# Patient Record
Sex: FEMALE | Race: WHITE | Employment: PART TIME | URBAN - METROPOLITAN AREA
[De-identification: names, ages, dates, MRNs, and addresses within clinical notes are randomized per-mention and may not be internally consistent; named-entity substitution may affect disease eponyms.]

---
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
## 2024-06-24 NOTE — PROGRESS NOTES
Subjective:      Patient ID: Vincenzo Combs is a 25 y.o. female who presents today for:  Chief Complaint   Patient presents with    Otalgia     Patient presents today for left ear pain, onset 3 days, hurts to eat, post nasal drainge, no fever, pressure in ear, feels like fluid is in her ear,        Otalgia   There is pain in the left ear. This is a new problem. The current episode started in the past 7 days (x 3 days). The problem occurs constantly. The problem has been gradually worsening. There has been no fever. The pain is at a severity of 4/10. The pain is mild. Associated symptoms include rhinorrhea. Pertinent negatives include no coughing, diarrhea, ear discharge, headaches, hearing loss, rash, sore throat or vomiting. She has tried acetaminophen for the symptoms. The treatment provided mild relief. There is no history of a chronic ear infection, hearing loss or a tympanostomy tube. Past Medical History:   Diagnosis Date    Depression      No past surgical history on file. Social History     Socioeconomic History    Marital status: Single     Spouse name: Not on file    Number of children: Not on file    Years of education: Not on file    Highest education level: Not on file   Occupational History    Not on file   Tobacco Use    Smoking status: Never Smoker    Smokeless tobacco: Never Used   Vaping Use    Vaping Use: Never used   Substance and Sexual Activity    Alcohol use: No    Drug use: No    Sexual activity: Not on file   Other Topics Concern    Not on file   Social History Narrative    Not on file     Social Determinants of Health     Financial Resource Strain: Low Risk     Difficulty of Paying Living Expenses: Not hard at all   Food Insecurity: No Food Insecurity    Worried About [address] [employer] in the Last Year: Never true    Molly of Food in the Last Year: Never true   Transportation Needs: No Transportation Needs    Lack of Transportation (Medical):  No    Lack of Transportation (Non-Medical): No   Physical Activity:     Days of Exercise per Week:     Minutes of Exercise per Session:    Stress:     Feeling of Stress :    Social Connections:     Frequency of Communication with Friends and Family:     Frequency of Social Gatherings with Friends and Family:     Attends [employer] Services:     Active Member of Clubs or Organizations:     Attends Club or Organization Meetings:     Marital Status:    Intimate Partner Violence:     Fear of Current or Ex-Partner:     Emotionally Abused:     Physically Abused:     Sexually Abused:      No family history on file. No Known Allergies      Review of Systems   Constitutional: Negative for activity change, appetite change, chills, fatigue and fever. HENT: Positive for ear pain (left ear pain x 3 days), postnasal drip and rhinorrhea. Negative for congestion, drooling, ear discharge, hearing loss, sinus pain, sore throat and trouble swallowing. Pt reports \"it feels like fluid\"     Eyes: Negative for redness, itching and visual disturbance. Respiratory: Negative for apnea, cough, chest tightness, shortness of breath and wheezing. Cardiovascular: Negative for chest pain and palpitations. Gastrointestinal: Negative for abdominal distention, constipation, diarrhea, nausea and vomiting. Endocrine: Negative for heat intolerance. Genitourinary: Negative for difficulty urinating, flank pain and genital sores. Musculoskeletal: Negative for arthralgias, gait problem, myalgias and neck stiffness. Skin: Negative for rash. Neurological: Negative for tremors, seizures, facial asymmetry and headaches. Hematological: Negative for adenopathy. Psychiatric/Behavioral: Negative for confusion and suicidal ideas. All other systems reviewed and are negative.       Objective:   /64   Pulse 99   Temp 98.6 °F (37 °C)   Resp 18   Ht 5' 11\" (1.803 m)   Wt 247 lb 4.8 oz (112.2 kg)   SpO2 99%   BMI 34.49 kg/m² Physical Exam  Vitals and nursing note reviewed. Constitutional:       General: She is awake. She is not in acute distress. Appearance: Normal appearance. She is well-developed, well-groomed and overweight. She is not ill-appearing, toxic-appearing or diaphoretic. HENT:      Head: Normocephalic and atraumatic. Right Ear: External ear normal. No decreased hearing noted. There is no impacted cerumen. Left Ear: Hearing and external ear normal. No decreased hearing noted. No swelling or tenderness. A middle ear effusion is present. There is no impacted cerumen. Tympanic membrane is injected and erythematous. Nose: No rhinorrhea. Mouth/Throat:      Mouth: Mucous membranes are moist.      Pharynx: No posterior oropharyngeal erythema. Eyes:      Pupils: Pupils are equal, round, and reactive to light. Cardiovascular:      Rate and Rhythm: Normal rate and regular rhythm. Pulses: Normal pulses. Heart sounds: Normal heart sounds. No murmur heard. Pulmonary:      Effort: Pulmonary effort is normal. No tachypnea, bradypnea or respiratory distress. Breath sounds: Normal breath sounds and air entry. No stridor or transmitted upper airway sounds. No decreased breath sounds or wheezing. Chest:      Chest wall: No tenderness. Abdominal:      General: Abdomen is flat. Bowel sounds are normal.      Palpations: Abdomen is soft. Hernia: No hernia is present. Musculoskeletal:         General: Normal range of motion. Cervical back: Normal range of motion. Skin:     General: Skin is warm and dry. Capillary Refill: Capillary refill takes less than 2 seconds. Findings: No erythema or rash. Neurological:      General: No focal deficit present. Mental Status: She is alert and oriented to person, place, and time. Mental status is at baseline. Motor: No weakness.    Psychiatric:         Attention and Perception: Attention and perception normal. Mood and Affect: Mood and affect normal.         Speech: Speech normal.         Behavior: Behavior normal. Behavior is cooperative. Thought Content: Thought content normal.         Judgment: Judgment normal.         Assessment:       Diagnosis Orders   1. Acute otitis media with effusion of left ear  amoxicillin (AMOXIL) 875 MG tablet         Plan:      No orders of the defined types were placed in this encounter. Orders Placed This Encounter   Medications    amoxicillin (AMOXIL) 875 MG tablet     Sig: Take 1 tablet by mouth 2 times daily for 10 days     Dispense:  20 tablet     Refill:  0     Pt here today with her mom after pt c/o left ear pain and pressure x 3 days. Pt has a middle ear infection noted today and declines any vertigo today. Pt was prescribed an oral ATB today and to use Tylenol/Ibuprofen as needed for any ear pain noted. Pt verbalized understanding of the [place]. Pt and her mom left the [facility] today in  Stable condition. Discussed signs and symptoms which require immediate follow-up in ED/call to 911. Patient verbalized understanding. Antibiotic Instructions: Complete the full course of antibiotics as ordered. Take each dose with a small snack or meal to lessen potential GI upset. To prevent antibiotic resistance, please take medication as ordered and for the full duration even if you start to feel better. Consider intake of yogurt or probiotic during antibiotic use and for a few days after to help reduce the risk of developing a secondary infection. Separate the yogurt and antibiotic by at least 1 hour. Avoid alcohol while taking antibiotics. Return if symptoms worsen or fail to improve. Reviewed with the patient: current clinical status, medications, activities and diet.      Side effects, adverse effects of the medication prescribed today, as well as treatment plan and result expectations have been discussed with the patient who expresses understanding and desires to skin lesions initially c/f monkeypox, but now less likely given +MRSA bacteremia  -- cont. airborne precautions for now  -- f/u Mpox PCR  -- appreciate ID recs

## 2025-03-13 ENCOUNTER — HOSPITAL ENCOUNTER (INPATIENT)
Age: 25
LOS: 7 days | Discharge: HOME | DRG: 885 | End: 2025-03-20
Attending: PSYCHIATRY & NEUROLOGY | Admitting: PSYCHIATRY & NEUROLOGY
Payer: COMMERCIAL

## 2025-03-13 DIAGNOSIS — F29 PSYCHOSIS, UNSPECIFIED PSYCHOSIS TYPE (HCC): Primary | ICD-10-CM

## 2025-03-13 LAB
ALBUMIN SERPL-MCNC: 4.1 G/DL (ref 3.5–4.6)
ALP SERPL-CCNC: 81 U/L (ref 40–130)
ALT SERPL-CCNC: 17 U/L (ref 0–33)
AMPHET UR QL SCN: ABNORMAL
ANION GAP SERPL CALCULATED.3IONS-SCNC: 11 MEQ/L (ref 9–15)
APAP SERPL-MCNC: <5 UG/ML (ref 10–30)
AST SERPL-CCNC: 20 U/L (ref 0–35)
BARBITURATES UR QL SCN: ABNORMAL
BASOPHILS # BLD: 0 K/UL (ref 0–0.2)
BASOPHILS NFR BLD: 0.2 %
BENZODIAZ UR QL SCN: ABNORMAL
BILIRUB SERPL-MCNC: 0.3 MG/DL (ref 0.2–0.7)
BILIRUB UR QL STRIP: NEGATIVE
BUN SERPL-MCNC: 6 MG/DL (ref 6–20)
CALCIUM SERPL-MCNC: 9.6 MG/DL (ref 8.5–9.9)
CANNABINOIDS UR QL SCN: POSITIVE
CHLORIDE SERPL-SCNC: 105 MEQ/L (ref 95–107)
CHOLEST SERPL-MCNC: 140 MG/DL (ref 0–199)
CK SERPL-CCNC: 108 U/L (ref 0–170)
CLARITY UR: CLEAR
CO2 SERPL-SCNC: 22 MEQ/L (ref 20–31)
COCAINE UR QL SCN: ABNORMAL
COLOR UR: YELLOW
CREAT SERPL-MCNC: 0.65 MG/DL (ref 0.5–0.9)
DRUG SCREEN COMMENT UR-IMP: ABNORMAL
EOSINOPHIL # BLD: 0 K/UL (ref 0–0.7)
EOSINOPHIL NFR BLD: 0.3 %
ERYTHROCYTE [DISTWIDTH] IN BLOOD BY AUTOMATED COUNT: 13.6 % (ref 11.5–14.5)
ETHANOL PERCENT: NORMAL G/DL
ETHANOLAMINE SERPL-MCNC: <10 MG/DL (ref 0–0.08)
FENTANYL SCREEN, URINE: ABNORMAL
GLOBULIN SER CALC-MCNC: 3.2 G/DL (ref 2.3–3.5)
GLUCOSE SERPL-MCNC: 115 MG/DL (ref 70–99)
GLUCOSE UR STRIP-MCNC: NEGATIVE MG/DL
HCG UR QL: NEGATIVE
HCT VFR BLD AUTO: 39.4 % (ref 37–47)
HDLC SERPL-MCNC: 34 MG/DL (ref 40–59)
HGB BLD-MCNC: 13.7 G/DL (ref 12–16)
HGB UR QL STRIP: NEGATIVE
KETONES UR STRIP-MCNC: 15 MG/DL
LDLC SERPL CALC-MCNC: 94 MG/DL (ref 0–129)
LEUKOCYTE ESTERASE UR QL STRIP: NEGATIVE
LYMPHOCYTES # BLD: 1.6 K/UL (ref 1–4.8)
LYMPHOCYTES NFR BLD: 11.3 %
MCH RBC QN AUTO: 30.6 PG (ref 27–31.3)
MCHC RBC AUTO-ENTMCNC: 34.8 % (ref 33–37)
MCV RBC AUTO: 88.1 FL (ref 79.4–94.8)
METHADONE UR QL SCN: ABNORMAL
MONOCYTES # BLD: 1.2 K/UL (ref 0.2–0.8)
MONOCYTES NFR BLD: 8.7 %
NEUTROPHILS # BLD: 10.8 K/UL (ref 1.4–6.5)
NEUTS SEG NFR BLD: 79.1 %
NITRITE UR QL STRIP: NEGATIVE
OPIATES UR QL SCN: ABNORMAL
OXYCODONE UR QL SCN: ABNORMAL
PCP UR QL SCN: ABNORMAL
PH UR STRIP: 7 [PH] (ref 5–9)
PLATELET # BLD AUTO: 273 K/UL (ref 130–400)
POTASSIUM SERPL-SCNC: 3.3 MEQ/L (ref 3.4–4.9)
PROPOXYPH UR QL SCN: ABNORMAL
PROT SERPL-MCNC: 7.3 G/DL (ref 6.3–8)
PROT UR STRIP-MCNC: NEGATIVE MG/DL
RBC # BLD AUTO: 4.47 M/UL (ref 4.2–5.4)
SALICYLATES SERPL-MCNC: <0.3 MG/DL (ref 15–30)
SODIUM SERPL-SCNC: 138 MEQ/L (ref 135–144)
SP GR UR STRIP: 1 (ref 1–1.03)
TRIGL SERPL-MCNC: 62 MG/DL (ref 0–150)
TSH SERPL-MCNC: 0.39 UIU/ML (ref 0.44–3.86)
URINE REFLEX TO CULTURE: ABNORMAL
UROBILINOGEN UR STRIP-ACNC: 0.2 E.U./DL
WBC # BLD AUTO: 13.7 K/UL (ref 4.8–10.8)

## 2025-03-13 PROCEDURE — 84703 CHORIONIC GONADOTROPIN ASSAY: CPT

## 2025-03-13 PROCEDURE — 80179 DRUG ASSAY SALICYLATE: CPT

## 2025-03-13 PROCEDURE — 81003 URINALYSIS AUTO W/O SCOPE: CPT

## 2025-03-13 PROCEDURE — 80053 COMPREHEN METABOLIC PANEL: CPT

## 2025-03-13 PROCEDURE — 6370000000 HC RX 637 (ALT 250 FOR IP): Performed by: PSYCHIATRY & NEUROLOGY

## 2025-03-13 PROCEDURE — 84443 ASSAY THYROID STIM HORMONE: CPT

## 2025-03-13 PROCEDURE — 80307 DRUG TEST PRSMV CHEM ANLYZR: CPT

## 2025-03-13 PROCEDURE — 80143 DRUG ASSAY ACETAMINOPHEN: CPT

## 2025-03-13 PROCEDURE — 82550 ASSAY OF CK (CPK): CPT

## 2025-03-13 PROCEDURE — 36415 COLL VENOUS BLD VENIPUNCTURE: CPT

## 2025-03-13 PROCEDURE — 83036 HEMOGLOBIN GLYCOSYLATED A1C: CPT

## 2025-03-13 PROCEDURE — 85025 COMPLETE CBC W/AUTO DIFF WBC: CPT

## 2025-03-13 PROCEDURE — 82077 ASSAY SPEC XCP UR&BREATH IA: CPT

## 2025-03-13 PROCEDURE — 80061 LIPID PANEL: CPT

## 2025-03-13 PROCEDURE — 99285 EMERGENCY DEPT VISIT HI MDM: CPT

## 2025-03-13 PROCEDURE — 6370000000 HC RX 637 (ALT 250 FOR IP)

## 2025-03-13 PROCEDURE — 93005 ELECTROCARDIOGRAM TRACING: CPT

## 2025-03-13 PROCEDURE — 1240000000 HC EMOTIONAL WELLNESS R&B

## 2025-03-13 RX ORDER — HALOPERIDOL 5 MG/1
5 TABLET ORAL EVERY 6 HOURS PRN
Status: DISCONTINUED | OUTPATIENT
Start: 2025-03-13 | End: 2025-03-20 | Stop reason: HOSPADM

## 2025-03-13 RX ORDER — TRAZODONE HYDROCHLORIDE 50 MG/1
50 TABLET ORAL NIGHTLY PRN
Status: DISCONTINUED | OUTPATIENT
Start: 2025-03-13 | End: 2025-03-16

## 2025-03-13 RX ORDER — HALOPERIDOL 5 MG/ML
5 INJECTION INTRAMUSCULAR EVERY 6 HOURS PRN
Status: DISCONTINUED | OUTPATIENT
Start: 2025-03-13 | End: 2025-03-20 | Stop reason: HOSPADM

## 2025-03-13 RX ORDER — BENZTROPINE MESYLATE 1 MG/ML
2 INJECTION, SOLUTION INTRAMUSCULAR; INTRAVENOUS 2 TIMES DAILY PRN
Status: DISCONTINUED | OUTPATIENT
Start: 2025-03-13 | End: 2025-03-20 | Stop reason: HOSPADM

## 2025-03-13 RX ORDER — HYDROXYZINE PAMOATE 50 MG/1
50 CAPSULE ORAL EVERY 6 HOURS PRN
Status: DISCONTINUED | OUTPATIENT
Start: 2025-03-13 | End: 2025-03-20 | Stop reason: HOSPADM

## 2025-03-13 RX ORDER — HYDROXYZINE HYDROCHLORIDE 50 MG/ML
50 INJECTION, SOLUTION INTRAMUSCULAR EVERY 6 HOURS PRN
Status: DISCONTINUED | OUTPATIENT
Start: 2025-03-13 | End: 2025-03-20 | Stop reason: HOSPADM

## 2025-03-13 RX ORDER — ACETAMINOPHEN 325 MG/1
650 TABLET ORAL EVERY 4 HOURS PRN
Status: DISCONTINUED | OUTPATIENT
Start: 2025-03-13 | End: 2025-03-20 | Stop reason: HOSPADM

## 2025-03-13 RX ORDER — POLYETHYLENE GLYCOL 3350 17 G
2 POWDER IN PACKET (EA) ORAL
Status: DISCONTINUED | OUTPATIENT
Start: 2025-03-13 | End: 2025-03-14

## 2025-03-13 RX ORDER — MAGNESIUM HYDROXIDE/ALUMINUM HYDROXICE/SIMETHICONE 120; 1200; 1200 MG/30ML; MG/30ML; MG/30ML
30 SUSPENSION ORAL PRN
Status: DISCONTINUED | OUTPATIENT
Start: 2025-03-13 | End: 2025-03-20 | Stop reason: HOSPADM

## 2025-03-13 RX ORDER — POLYETHYLENE GLYCOL 3350 17 G
2 POWDER IN PACKET (EA) ORAL
Status: DISCONTINUED | OUTPATIENT
Start: 2025-03-13 | End: 2025-03-20 | Stop reason: HOSPADM

## 2025-03-13 RX ADMIN — NICOTINE POLACRILEX 2 MG: 2 LOZENGE ORAL at 17:06

## 2025-03-13 RX ADMIN — NICOTINE POLACRILEX 2 MG: 2 LOZENGE ORAL at 21:07

## 2025-03-13 RX ADMIN — TRAZODONE HYDROCHLORIDE 50 MG: 50 TABLET ORAL at 21:05

## 2025-03-13 ASSESSMENT — PATIENT HEALTH QUESTIONNAIRE - PHQ9
9. THOUGHTS THAT YOU WOULD BE BETTER OFF DEAD, OR OF HURTING YOURSELF: SEVERAL DAYS
5. POOR APPETITE OR OVEREATING: SEVERAL DAYS
4. FEELING TIRED OR HAVING LITTLE ENERGY: SEVERAL DAYS
SUM OF ALL RESPONSES TO PHQ QUESTIONS 1-9: 15
SUM OF ALL RESPONSES TO PHQ QUESTIONS 1-9: 16
7. TROUBLE CONCENTRATING ON THINGS, SUCH AS READING THE NEWSPAPER OR WATCHING TELEVISION: NEARLY EVERY DAY
6. FEELING BAD ABOUT YOURSELF - OR THAT YOU ARE A FAILURE OR HAVE LET YOURSELF OR YOUR FAMILY DOWN: NEARLY EVERY DAY
SUM OF ALL RESPONSES TO PHQ QUESTIONS 1-9: 16
8. MOVING OR SPEAKING SO SLOWLY THAT OTHER PEOPLE COULD HAVE NOTICED. OR THE OPPOSITE, BEING SO FIGETY OR RESTLESS THAT YOU HAVE BEEN MOVING AROUND A LOT MORE THAN USUAL: NOT AT ALL
SUM OF ALL RESPONSES TO PHQ QUESTIONS 1-9: 16
1. LITTLE INTEREST OR PLEASURE IN DOING THINGS: NEARLY EVERY DAY
10. IF YOU CHECKED OFF ANY PROBLEMS, HOW DIFFICULT HAVE THESE PROBLEMS MADE IT FOR YOU TO DO YOUR WORK, TAKE CARE OF THINGS AT HOME, OR GET ALONG WITH OTHER PEOPLE: VERY DIFFICULT
2. FEELING DOWN, DEPRESSED OR HOPELESS: NEARLY EVERY DAY
3. TROUBLE FALLING OR STAYING ASLEEP: SEVERAL DAYS

## 2025-03-13 ASSESSMENT — SLEEP AND FATIGUE QUESTIONNAIRES
DO YOU USE A SLEEP AID: YES
AVERAGE NUMBER OF SLEEP HOURS: 6
DO YOU HAVE DIFFICULTY SLEEPING: YES

## 2025-03-13 ASSESSMENT — LIFESTYLE VARIABLES
HOW MANY STANDARD DRINKS CONTAINING ALCOHOL DO YOU HAVE ON A TYPICAL DAY: 1 OR 2
HOW OFTEN DO YOU HAVE A DRINK CONTAINING ALCOHOL: MONTHLY OR LESS

## 2025-03-13 NOTE — ED NOTES
Pt reviewed with Dr. Dahl. Reviewed past hx and current assessment. Received order to admit to [facility].

## 2025-03-13 NOTE — ED NOTES
Patient changed into psych safe clothing.   Urine  obtained and sent to lab.   Skin and contraband check performed.   Contraband check negative at this time.   Lab called to draw blood.

## 2025-03-13 NOTE — ED PROVIDER NOTES
[facility] EMERGENCY DEPARTMENT  EMERGENCY DEPARTMENT ENCOUNTER      Pt Name: Mike Wolf  MRN: [id number]  Birthdate 2000  Date of evaluation: 3/13/2025  Provider: HÉCTOR Marina  2:23 PM EDT      CHIEF COMPLAINT       Chief Complaint   Patient presents with    Psychiatric Evaluation         HISTORY OF PRESENT ILLNESS   (Location/Symptom, Timing/Onset, Context/Setting, Quality, Duration, Modifying Factors, Severity)  Note limiting factors.   Mike Wolf is a 25 y.o. female who presents to the emergency department with PMHx major depressive disorder, generalized anxiety disorder.  Patient presents to the ED for evaluation of psychiatric needs.  Patient was at [facility] today and was pink slipped secondary to the concern for paranoid delusions, and inability to care for her ADLs.  Patient was endorsing several paranoid delusions there, including that she feels that her girlfriend and parents are in a cult, feels that people are out to get her and are tracking her, feels that when she is out in the community she has high risk of getting shot.  Per [facility] report patient used to be on psychiatric medicines but she was taken off of all of them and now is not on anything.  On my assessment of the patient she is sleeping, I wake her up, she just tells me that she is tired.  I have to ask her several times why she is here.  She will state things such as \"I am just tired.\"  Or she will state \"it was just a lot.\"  Or she will state \"I am here because I am just too nice.\"  Patient is actively denying SI and HI.  She does endorse that she is paranoid but will not give any further details regarding this.  [facility] pink slip is also endorsing that patient is not caring for her ADLs as she should.    HPI    Nursing Notes were reviewed.    REVIEW OF SYSTEMS    (2-9 systems for level 4, 10 or more for level 5)     Review of Systems   Constitutional:  Negative for chills and fever.   HENT:  Negative for congestion.
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
## 2025-03-13 NOTE — ED NOTES
Call placed from [place] at [facility] stating [facility]

## 2025-03-13 NOTE — ED NOTES
Patient:   CELIA LUTZ            MRN: [id number]            FIN: [id number]               Age:   76 years     Sex:  Male     :  1942   Associated Diagnoses:   AAA (Abdominal Aortic Aneurysm); Adenocarcinoma of lung; COPD (chronic obstructive pulmonary disease) with acute bronchitis; Chronic kidney disease (CKD), stage III (moderate); Prostate cancer   Author:   Yong Boyd MD      Visit Information      Date of Service: 2019 04:18 pm  Performing Location: [facility]  Encounter#: [id number]      Primary Care Provider (PCP):  Yong Boyd MD    NPI# [id number]      Referring Provider:  Yong Boyd MD# [id number]      Chief Complaint   2019 4:32 PM CDT    f/u labs              Additional Information:No additional information recorded during visit.   Chief complaint and symptoms as noted above and confirmed with patient.  Recent lab and diagnostic studies reviewed with patient      History of Present Illness   2015: Mitchell presents to clinic for hospital follow-up.  He recently underwent right sided wedge resection of an isolated pulmonary nodule.  Biopsy returning as adenocarcinoma of the lung.  Postoperative course complicated by an episode of both acute kidney injury and rectal bleeding felt to be consistent with ischemic colitis.  His creatinine on discharge was at his baseline of 1.3.  His lisinopril was held during his episode of acute kidney injury, resumed upon discharge.  He is scheduled to see his thoracic surgeon, Dr. Velazquez, tomorrow.  He also has a history of previous endovascular repair of a abdominal aneurysm.  He follows with a vascular surgeon through [facility] for this.    10/25/2018: Mitchell returns for general follow-up.  Overall feeling well.  His wife recently underwent rotator cuff surgery and has been undergoing rehab.  No interval changes in health since her last visit.  He has yet to schedule with interventional vein specialist.  Did have a  Tele psych at bedside.   MRI of prostate after last visit.  Not currently following with urology.    11/14/2018: Mitchell presents to clinic at the request of his vascular surgeon, Dr. David for further evaluation of his kidney function.  He was scheduled to undergo a CT angiogram of his aorta for surveillance protocol of his previous endovascular aortic repair remotely.  Unfortunately his creatinine returned at 1.7 which was somewhat higher than his baseline of approximately 1.4-1.5.  This was below  eGFR threshold of 40 mL/min.  He feels fine.  No recent hypovolemic history or insult.  No recent medication changes.  He remains on lisinopril hydrochlorothiazide combination for history of hypertension    5/14/2019: Mitchell returns for general follow-up.  Feeling well.  Has planned right-sided vascular procedure with Dr. David scheduled in the near future; unclear on specific procedural information.  He had had a recent CT angiogram done earlier this year which did demonstrate a 2.2 cm pancreatic cyst.  Also reviewed labs showing a general upward trend in PSA.  Has not seen urology since 2016.  Did undergo a prostate MRI in April 2018 which did not demonstrate any extracapsular extension.         Review of Systems   Constitutional:  No fever, No chills.    Eye:  Negative except as documented in history of present illness.    Ear/Nose/Mouth/Throat:  Negative except as documented in history of present illness.    Respiratory   Cardiovascular:  Peripheral edema, No chest pain, No palpitations, No syncope.    Gastrointestinal:  No nausea, No vomiting, No heartburn, No abdominal pain.    Genitourinary:  No dysuria, No hematuria.    Hematology/Lymphatics:  Negative except as documented in history of present illness.    Endocrine:  No excessive thirst, No polyuria.    Immunologic:  No recurrent fevers.    Musculoskeletal:  Joint pain, Decreased range of motion, No muscle pain, No trauma.    Neurologic:  Alert and oriented X4, No numbness, No tingling, No  headache.       Health Status   Allergies:    Allergic Reactions (Selected)  No known allergies   Medications:  (Selected)   Prescriptions  Prescribed  Advair  mcg-21 mcg/inh inhalation aerosol: See Instructions, Instructions: INHALE 2 PUFFS BY MOUTH TWICE DAILY. RINSE MOUTH AND THROAT AFTER USE, # 3 EA, 1 Refill(s), Type: Soft Stop, Pharmacy: [facility] [id number] [facility], keep on file and pt will notify when needed, INHALE 2 PUFFS BY MOUTH TWICE ALFREDITO...  Metoprolol Tartrate 50 mg oral tablet: = 1 tab(s) ( 50 mg ), po, bid, # 180 tab(s), 3 Refill(s), Type: Maintenance, Pharmacy: [facility] [id number] [facility], keep on file and pt will notify when needed, 1 tab(s) Oral bid  Proventil HFA 90 mcg/inh inhalation aerosol: 2 puff(s), INH, q4hr (int), Instructions: prn SOB/cough  use with spacer chamber, # 1 EA, 3 Refill(s), Type: Maintenance, Pharmacy: [id number], 2 puff(s) Inhale q4 hrs,Instr:prn SOB/cough; use with spacer chamber  hydrochlorothiazide-lisinopril 12.5 mg-20 mg oral tablet: 1 tab(s), PO, Daily, # 90 tab(s), 3 Refill(s), Type: Maintenance, Pharmacy: [facility] [id number] [facility], keep on file and pt will notify when needed, 1 tab(s) Oral daily  pravastatin 80 mg oral tablet: = 1 tab(s) ( 80 mg ), po, daily, # 90 tab(s), 3 Refill(s), Type: Maintenance, Pharmacy: [facility] [id number] [facility], keep on file and pt will notify when needed, 1 tab(s) Oral daily  spacer for inhaler: spacer for inhaler, See Instructions, Instructions: use with albuterol inhaler, Supply, # 1 EA, 0 Refill(s), Type: Maintenance, Pharmacy: [facility], use with albuterol inhaler  Documented Medications  Documented  Advil PM: 2 tab(s), po, hs, PRN: as needed for insomnia, 0 Refill(s), Type: Maintenance  Flonase 50 mcg/inh nasal spray: 1 spray(s), nasal, daily, 0 Refill(s), Type: Maintenance  Multiple Vitamins oral tablet: 1 tab(s), po, daily, 0 Refill(s), Type: Maintenance  Vitamin B12 500 mcg oral tablet: 1 tab(s) ( 500 mcg ), po, daily, 0 Refill(s),  Type: Maintenance  lansoprazole 15 mg oral delayed release capsule: = 1 cap(s) ( 15 mg ), PO, Daily, # 90 cap(s), 0 Refill(s), Type: Maintenance  magnesium oxide 250 mg oral tablet: 1 tab(s) ( 250 mg ), po, daily, 0 Refill(s), Type: Maintenance  potassium gluconate: ( 99 mg ), daily, 0 Refill(s), Type: Maintenance,    Medications          *denotes recorded medication          spacer for inhaler: See Instructions, use with albuterol inhaler, 1 EA, 0 Refill(s).          Proventil HFA 90 mcg/inh inhalation aerosol: 2 puff(s), INH, q4hr (int), prn SOB/cough  use with spacer chamber, 1 EA, 3 Refill(s).          *Vitamin B12 500 mcg oral tablet: 500 mcg, 1 tab(s), po, daily, 0 Refill(s).          *Advil PM: 2 tab(s), po, hs, PRN: as needed for insomnia, 0 Refill(s).          *Flonase 50 mcg/inh nasal spray: 1 spray(s), nasal, daily, 0 Refill(s).          Advair  mcg-21 mcg/inh inhalation aerosol: See Instructions, INHALE 2 PUFFS BY MOUTH TWICE DAILY. RINSE MOUTH AND THROAT AFTER USE, 3 EA, 1 Refill(s).          hydrochlorothiazide-lisinopril 12.5 mg-20 mg oral tablet: 1 tab(s), PO, Daily, 90 tab(s), 3 Refill(s).          *lansoprazole 15 mg oral delayed release capsule: 15 mg, 1 cap(s), PO, Daily, 90 cap(s), 0 Refill(s).          *magnesium oxide 250 mg oral tablet: 250 mg, 1 tab(s), po, daily, 0 Refill(s).          Metoprolol Tartrate 50 mg oral tablet: 50 mg, 1 tab(s), po, bid, 180 tab(s), 3 Refill(s).          *Multiple Vitamins oral tablet: 1 tab(s), po, daily, 0 Refill(s).          *potassium gluconate: 99 mg, daily, 0 Refill(s).          pravastatin 80 mg oral tablet: 80 mg, 1 tab(s), po, daily, 90 tab(s), 3 Refill(s).     Problem list:    All Problems  AAA (abdominal aortic aneurysm) / SNOMED CT 514965654 / Confirmed  Adenocarcinoma of lung / SNOMED CT 073882055 / Confirmed  [place] Esophagus / SNOMED CT 5896079079 / Confirmed  CKD (chronic kidney disease) stage 3, GFR 30-59 ml/min / SNOMED CT 1878603795 /  Confirmed  COPD (chronic obstructive pulmonary disease) with acute bronchitis / SNOMED CT 58839244 / Confirmed  Closed hip fracture / SNOMED CT 923440680 / Confirmed  Closed fracture of trochanter of femur / SNOMED CT 5526693169 / Confirmed  Cardiac arrhythmia / SNOMED CT 23726477 / Confirmed  Coronary artery disease due to lipid rich plaque / SNOMED CT 8674931779 / Confirmed  Lipids abnormal / SNOMED CT 616201707 / Confirmed  HTN (Hypertension) / SNOMED CT 94974610 / Confirmed  Former Smoker / SNOMED CT [id number] / Confirmed  GERD (gastroesophageal reflux disease) / SNOMED CT 8865856684 / Confirmed  Anticoagulated / SNOMED CT 125283958 / Confirmed  History of oropharyngeal cancer / SNOMED CT 5477530365 / Confirmed  H/O prostate cancer / SNOMED CT 4117650622 / Confirmed  DJD (Degenerative Joint Disease) / SNOMED CT 9589039867 / Confirmed  H/O unilateral nephrectomy / SNOMED CT 787748615 / Confirmed  Resolved: *Hospitalized@[facility] Atypical chest pain  Resolved: *Hospitalized@[facility] Hip fracture  Resolved: *Hospitalized@[facility] Septic hip vs hematoma  Resolved: History of sepsis / SNOMED CT 5414605449  Resolved: Prostate cancer / SNOMED CT 730322850  Canceled: Oropharyngeal cancer / SNOMED CT 216977590  Canceled: Solitary kidney / SNOMED CT 798965821  Canceled: Solitary kidney / SNOMED CT 382796405      Histories   Past Medical History:    Active  AAA (abdominal aortic aneurysm) (029070566)  Coronary artery disease due to lipid rich plaque (7221398403)  GERD (gastroesophageal reflux disease) (9105570891)  Cardiac arrhythmia (09344084)  CKD (chronic kidney disease) stage 3, GFR 30-59 ml/min (0097068193)  Resolved  *Hospitalized@[facility] Septic hip vs hematoma: Onset on 7/3/2016 at 73 years.  Resolved on 7/7/2016 at 73 years.  History of sepsis (8202792283): Onset on 7/3/2016 at 73 years.  Resolved.  Comments:  7/25/2016 CDT 2:14 PM CDT - Simon , Lorri Severe; due to septic hip.    7/25/2016  CDT 3:37 PM CDT - Alma Barfield  Sepsis organ failure: Acute renal failure.  *Hospitalized@[facility] - Hip fracture: Onset on 2016 at 73 years.  Resolved on 2016 at 73 years.  *Hospitalized@[facility] - Atypical chest pain: Onset on 2015 at 72 years.  Resolved on 2015 at 72 years.  Prostate cancer (116524144):  Resolved.   Family History:    CA - Breast cancer  Sister (Alexa, )  Lupus  Sister (Rebecca)  Alcohol abuse  Sister (Alexa, )  Mother ()  SMA type III  Grandfather (M)  Obese  Sister (Sujey, )     Procedure history:    Colonoscopy (673689645) on 3/21/2017 at 74 Years.  Comments:  3/22/2017 2:33 PM CDT - Filippo Barbour MD  Indication: Adenomatous Polyps  Sedation: MAC  Findings: Adenomatous polyp cecum, hemorrohids  Rec: Repeat in 5 years  Right Hip Bipoloar Hemiarthroplasty on 2016 at 73 Years.  Comments:  2016 7:26 AM CDT - Debby Faustin CMA  Right displasced femoral neck fracture  right thoracotomy on 11/15/2015 at 73 Years.  Comments:  2015 9:07 AM CST - Debby Faustin CMA  wedge resection right upper lung nodule, wedge resection right middle lobe lung nodule, Mediastinal lymph node dissection  Colonoscopy (580031806) on 2014 at 71 Years.  Comments:  5/15/2014 10:56 AM CDT - Livier Stallings RN  Sedation: midazolam, fentanyl  Indication: screen  5-6mm tubular adenom x3, 8mm tubular adenoma x1, 12mm tubular adenoma with advance adenoma due to size  Repeat in 3 years.  nepherectomy in the month of 3/2009 at 66 Years.  Lumbar discectomy in  at 64 Years.  Left salivary gland removal in  at 53 Years.  Oral surgery in  at 52 Years.  Aortic aneurysm, abdominal ([id number]).   Social History:        Alcohol Assessment            Current, 1-2 times per week, 2 drinks/episode average.  3 drinks/episode maximum.      Tobacco Assessment            Past, 20 per day.  52 year(s).      Substance Abuse Assessment             Never      Employment and Education Assessment            Employed, Work/School description: [employer] .      Home and Environment Assessment            Marital status: .  Spouse/Partner name: Darlene Chau.      Nutrition and Health Assessment            Type of diet: Regular.      Exercise and Physical Activity Assessment            Exercise frequency: Never.      Sexual Assessment            Sexually active: Yes.      Physical Examination   vital signs stable, as noted above   Vital Signs   5/14/2019 4:32 PM CDT Temperature Tympanic 96 DegF  LOW    Peripheral Pulse Rate 88 bpm    Systolic Blood Pressure 138 mmHg  HI    Diastolic Blood Pressure 80 mmHg    Mean Arterial Pressure 99 mmHg    Oxygen Saturation 94 %      Measurements from flowsheet : Measurements   5/14/2019 4:32 PM CDT    Weight Measured - Standard                192.12 lb     General:  Alert and oriented, No acute distress.    Eye:  Extraocular movements are intact.    HENT:  Normocephalic, Oral mucosa is moist, No pharyngeal erythema, dentures.    Neck:  Supple, Previous right sided neck dissection with flap.    Respiratory:  Lungs are clear to auscultation, Respirations are non-labored, posterior thoracotomy scar.    Cardiovascular:  Normal rate, Regular rhythm, No murmur, No edema.    Gastrointestinal:  Soft.    Lymphatics:  right ankle/pretibial varicosities; soft to touch.    Neurologic:  Alert, Oriented, Normal motor function, No focal deficits.    Cognition and Speech:  Oriented, Speech clear and coherent.    Psychiatric:  Appropriate mood & affect.       Review / Management   Results review:  Lab results   5/6/2019 8:06 AM CDT Sodium Level 137 mmol/L    Potassium Level 5.2 mmol/L    Chloride Level 99 mmol/L    CO2 Level 32 mmol/L    Glucose Level 96 mg/dL    BUN 24 mg/dL    Creatinine 1.32 mg/dL  HI    BUN/Creat Ratio 18    eGFR 53 mL/min/1.73m2  LOW    eGFR African American 61 mL/min/1.73m2    Calcium Level 9.9 mg/dL    Phosphorus  Level 3.4 mg/dL    PTH Intact 25 pg/mL    Alk Phos 82 unit/L    Cholesterol 227 mg/dL  HI    Non-  HI    HDL 70 mg/dL    Chol/HDL Ratio 3.2      HI    Triglyceride 225 mg/dL  HI    PSA 15.8 ng/mL  HI    U Protein 62 mg/dL  HI    U Protein/Creatinine Ratio 969  HI    Ur Creatinine 64 mg/dL    WBC 4.3    RBC 4.08  LOW    Hgb 12.7 gm/dL  LOW    Hct 37.0 %  LOW    MCV 90.7 fL    MCH 31.1 pg    MCHC 34.3 gm/dL    RDW 14.0 %    Platelet 177    MPV 9.3 fL   .       Impression and Plan   Diagnosis     AAA (Abdominal Aortic Aneurysm) (ACW19-QV I71.4).     Adenocarcinoma of lung (INK61-HI C34.90).     COPD (chronic obstructive pulmonary disease) with acute bronchitis (RGA68-XD J44.1).     Chronic kidney disease (CKD), stage III (moderate) (VDH52-TR N18.3).     Prostate cancer (MTD19-UX C61).         .) h/o AAA, endovascular aortic repair, follows with Dr. David     .) chronic vein disease  - planned greater saphenous vein closure planned in near future    .) polyarthritis pains  - limited benefit with NSAIDs and APAP  s/p right MIRNA after hip fracture from fall (6/2016)   - normal DEXA (7/2015)  - doubt inflammatory arthritis; doubt polymyalgia rheumatica given no upper extremity involvement  - working with Ortho - lasting benefits from intra-articular injections    .) prostate Ca   - original prostate biopsy in '11, repeat biopsy in '16; [place] 6   - watchful surveillance; q6 month PSA; last PSA 15   - MRI of prostate (4/2018): focal coarse calcifications in prostate; no evidence of extra-prostate extension   - last seen by Urology in Fall, 2016; I'd like for him to reconnect with Dr. Bustos to discuss further surveillance mgmt    .) pancreatic cyst  - referral to GI; especially in context of multiple malignancies    .) oropharyngeal cancer with flap '94    .) s/p right wedge resection of limited stage adenocarcinoma of lung (11/2015)    .) COPD   - on Advair 115/21mcg BID with noticeable improvement in  breathing   - albuterol on rare occasions   - has not participated in pulmonary rehab   - consider repeat PFTs in the future    .) hypertension; controlled  current antihypertensive regimen: lisinopril/hctz 20/12.5mg daily, metoprolol 50mg BID  regimen changes: none  intolerance:  future titration/work-up plan:    - SBP <140 goal    .) HLPD   - pravastatin 80mg qhs    .) health maintenance   - CRC due in '22   - q6 month PSA   - Prevacid 30mg BID   - enrolled in [facility]    RTC in 6 months

## 2025-03-14 LAB
ESTIMATED AVERAGE GLUCOSE: 94 MG/DL
HBA1C MFR BLD: 4.9 % (ref 4–6)

## 2025-03-14 PROCEDURE — 1240000000 HC EMOTIONAL WELLNESS R&B

## 2025-03-14 PROCEDURE — 6370000000 HC RX 637 (ALT 250 FOR IP): Performed by: PSYCHIATRY & NEUROLOGY

## 2025-03-14 RX ORDER — DIVALPROEX SODIUM 250 MG/1
250 TABLET, DELAYED RELEASE ORAL EVERY 8 HOURS SCHEDULED
Status: DISCONTINUED | OUTPATIENT
Start: 2025-03-14 | End: 2025-03-20 | Stop reason: HOSPADM

## 2025-03-14 RX ORDER — PALIPERIDONE 3 MG/1
3 TABLET, EXTENDED RELEASE ORAL DAILY
Status: DISCONTINUED | OUTPATIENT
Start: 2025-03-14 | End: 2025-03-20 | Stop reason: HOSPADM

## 2025-03-14 RX ADMIN — HYDROXYZINE PAMOATE 50 MG: 50 CAPSULE ORAL at 08:29

## 2025-03-14 RX ADMIN — PALIPERIDONE 3 MG: 3 TABLET, EXTENDED RELEASE ORAL at 11:41

## 2025-03-14 RX ADMIN — DIVALPROEX SODIUM 250 MG: 250 TABLET, DELAYED RELEASE ORAL at 13:43

## 2025-03-14 RX ADMIN — HYDROXYZINE PAMOATE 50 MG: 50 CAPSULE ORAL at 21:33

## 2025-03-14 RX ADMIN — ACETAMINOPHEN 650 MG: 325 TABLET ORAL at 23:40

## 2025-03-14 RX ADMIN — NICOTINE POLACRILEX 2 MG: 2 LOZENGE ORAL at 08:29

## 2025-03-14 RX ADMIN — ACETAMINOPHEN 650 MG: 325 TABLET ORAL at 08:28

## 2025-03-14 RX ADMIN — NICOTINE POLACRILEX 2 MG: 2 LOZENGE ORAL at 21:14

## 2025-03-14 RX ADMIN — DIVALPROEX SODIUM 250 MG: 250 TABLET, DELAYED RELEASE ORAL at 21:33

## 2025-03-14 RX ADMIN — NICOTINE POLACRILEX 2 MG: 2 LOZENGE ORAL at 16:33

## 2025-03-14 RX ADMIN — HALOPERIDOL 5 MG: 5 TABLET ORAL at 21:13

## 2025-03-14 RX ADMIN — NICOTINE POLACRILEX 2 MG: 2 LOZENGE ORAL at 11:40

## 2025-03-14 RX ADMIN — TRAZODONE HYDROCHLORIDE 50 MG: 50 TABLET ORAL at 21:33

## 2025-03-14 RX ADMIN — NICOTINE POLACRILEX 2 MG: 2 LOZENGE ORAL at 13:43

## 2025-03-14 RX ADMIN — HYDROXYZINE PAMOATE 50 MG: 50 CAPSULE ORAL at 15:27

## 2025-03-14 ASSESSMENT — PAIN DESCRIPTION - DESCRIPTORS
DESCRIPTORS: ACHING
DESCRIPTORS: SORE

## 2025-03-14 ASSESSMENT — PAIN DESCRIPTION - ORIENTATION: ORIENTATION: LOWER

## 2025-03-14 ASSESSMENT — PAIN SCALES - GENERAL
PAINLEVEL_OUTOF10: 3
PAINLEVEL_OUTOF10: 8
PAINLEVEL_OUTOF10: 0

## 2025-03-14 ASSESSMENT — PATIENT HEALTH QUESTIONNAIRE - PHQ9
10. IF YOU CHECKED OFF ANY PROBLEMS, HOW DIFFICULT HAVE THESE PROBLEMS MADE IT FOR YOU TO DO YOUR WORK, TAKE CARE OF THINGS AT HOME, OR GET ALONG WITH OTHER PEOPLE: VERY DIFFICULT
7. TROUBLE CONCENTRATING ON THINGS, SUCH AS READING THE NEWSPAPER OR WATCHING TELEVISION: SEVERAL DAYS
SUM OF ALL RESPONSES TO PHQ QUESTIONS 1-9: 14
2. FEELING DOWN, DEPRESSED OR HOPELESS: NEARLY EVERY DAY
1. LITTLE INTEREST OR PLEASURE IN DOING THINGS: NEARLY EVERY DAY
SUM OF ALL RESPONSES TO PHQ QUESTIONS 1-9: 14
6. FEELING BAD ABOUT YOURSELF - OR THAT YOU ARE A FAILURE OR HAVE LET YOURSELF OR YOUR FAMILY DOWN: MORE THAN HALF THE DAYS
SUM OF ALL RESPONSES TO PHQ QUESTIONS 1-9: 14
SUM OF ALL RESPONSES TO PHQ QUESTIONS 1-9: 14
8. MOVING OR SPEAKING SO SLOWLY THAT OTHER PEOPLE COULD HAVE NOTICED. OR THE OPPOSITE, BEING SO FIGETY OR RESTLESS THAT YOU HAVE BEEN MOVING AROUND A LOT MORE THAN USUAL: NOT AT ALL
4. FEELING TIRED OR HAVING LITTLE ENERGY: NOT AT ALL
5. POOR APPETITE OR OVEREATING: MORE THAN HALF THE DAYS
3. TROUBLE FALLING OR STAYING ASLEEP: NEARLY EVERY DAY
9. THOUGHTS THAT YOU WOULD BE BETTER OFF DEAD, OR OF HURTING YOURSELF: NOT AT ALL

## 2025-03-14 ASSESSMENT — LIFESTYLE VARIABLES
HOW OFTEN DO YOU HAVE A DRINK CONTAINING ALCOHOL: PATIENT DECLINED
HOW MANY STANDARD DRINKS CONTAINING ALCOHOL DO YOU HAVE ON A TYPICAL DAY: PATIENT DECLINED

## 2025-03-14 ASSESSMENT — PAIN DESCRIPTION - LOCATION
LOCATION: BACK

## 2025-03-14 NOTE — CONSULTS
Consult Note            Date:3/14/2025        Patient Name:Mike Wolf     YOB: 2000     Age:25 y.o.    Reason for Consult: Evaluation recommendation for chronic disease management    Chief Complaint     Chief Complaint   Patient presents with    Psychiatric Evaluation          History Obtained From   patient, electronic medical record    History of Present Illness   Patient is a 25-year-old admitted for psychiatric services for behavioral health evaluation.    Hospitalist consulted for evaluation and recommendations for acute and chronic disease management while receiving inpatient psych services.  Patient denies chest pain, palpitations, lightheadedness, headache, dizziness, shortness of breath, cough, N/V/D, and changes in appetite.  Patient also denies smoking, illicit drug, and alcohol use.  Denies self-inflicted injuries or wounds.  Patient has a past medical history of major depressive disorder, generalized anxiety disorder.          Past Medical History     Past Medical History:   Diagnosis Date    Depression         Past Surgical History   History reviewed. No pertinent surgical history.     Medications     Prior to Admission medications    Not on File        paliperidone (INVEGA) extended release tablet 3 mg, Daily  divalproex (DEPAKOTE) DR tablet 250 mg, 3 times per day  acetaminophen (TYLENOL) tablet 650 mg, Q4H PRN  magnesium hydroxide (MILK OF MAGNESIA) 400 MG/5ML suspension 30 mL, Daily PRN  aluminum & magnesium hydroxide-simethicone (MAALOX PLUS) 200-200-20 MG/5ML suspension 30 mL, PRN  haloperidol (HALDOL) tablet 5 mg, Q6H PRN   Or  haloperidol lactate (HALDOL) injection 5 mg, Q6H PRN  benztropine mesylate (COGENTIN) injection 2 mg, BID PRN  traZODone (DESYREL) tablet 50 mg, Nightly PRN  hydrOXYzine pamoate (VISTARIL) capsule 50 mg, Q6H PRN   Or  hydrOXYzine (VISTARIL) injection 50 mg, Q6H PRN  nicotine polacrilex (COMMIT) lozenge 2 mg, Q2H PRN        Allergies   Patient has no

## 2025-03-14 NOTE — H&P
Status 30/30      DIAGNOSIS:    Bipolar mixed severe with psychosis        RISK ASSESSMENT:    SUICIDE RISK ASSESSMENT: high  HOMICIDE: low  AGITATION/VIOLENCE: low  ELOPEMENT: low    LABS: REVIEWED TODAY:  Recent Labs     03/13/25  1324   WBC 13.7*   HGB 13.7        Recent Labs     03/13/25  1324      K 3.3*      CO2 22   BUN 6   CREATININE 0.65   GLUCOSE 115*     Recent Labs     03/13/25  1324   BILITOT 0.3   ALKPHOS 81   AST 20   ALT 17     Lab Results   Component Value Date/Time    BARBSCNU Neg 03/13/2025 12:54 PM    LABBENZ Neg 03/13/2025 12:54 PM    LABMETH Neg 03/13/2025 12:54 PM    ETOH <10 03/13/2025 01:24 PM     Lab Results   Component Value Date/Time    TSH 0.392 03/13/2025 01:24 PM     No results found for: \"LITHIUM\"  No results found for: \"VALPROATE\", \"CBMZ\"  No results found for: \"LITHIUM\", \"VALPROATE\"    FURTHER LABS ORDERED :      Radiology   No results found.    EKG: TRACING REVIEWED    TREATMENT PLAN:    Risk Management:  suicide risk    This patient was assessed for Medical bed necessity for the following reason:  N/A    Collateral Information:  Will obtain collateral information from the family or friends.  Will obtain medical records as appropriate from out patient providers  Will consult the hospitalist for a physical exam to rule out any co-morbid physical condition.    Home medication Reconciled       New Medications started during this admission :    See orders  Prn Haldol 5mg and Vistaril 50mg q6hr for extreme agitation.  Trazodone as ordered for insomnia  Vistaril as ordered for anxiety  Discussed with the patient risk, benefit, alternative and common side effects for the  proposed medication treatment. Patient is consenting to the treatment.    Psychotherapy:   Encourage participation in milieu and group therapy  Individual therapy as needed      Electronically signed by MARY RAVI MD on 3/14/2025 at 10:18 AM

## 2025-03-14 NOTE — CARE COORDINATION
Leisure Assessment  March 14, 2025 /  0955  am    Appearance: Alert, Appears as stated age, and In moderate distress  Current Mental Status: Cooperative, Delusional, and Severe anxiety  Affect/Mood: Congruent/ Fearful and Depressed  Thought Content/Processes: Loose or idiosyncratic associations, Vague, and Disorganized  Insight/Judgement: Poor insight and Fair judgment  Speech: normal rate and normal volume  Delusions/Hallucinations: paranoid and persecutory  Admit Status:  Involuntary     Patient identified her leisure interests were painting and listening to music. Patient described that her support system was recently disrupted as a result of the events leading to hospitalization. Patient reported that she feels she was set up by her family into getting engaged to her biological sister. Patient clarified that her fiance is her sister, and is unsure if her fiance/sister was also setting her up.   Patient was tearful in discussing information related to her childhood experiences. Patient expressed significant self-blame for the actions of adult family members, and shared that she \"grew up in a house of lies.\" When asked about goals during hospitalization, patient fixated on \"taking accountability,\" \"not lying anymore,\" and \"telling the truth.\" Patient identified her strengths were her kindness and that she wants to care for others.     Recommendations: Improve/Maintain Coping Skills Development, Improve/Maintain Emotion Regulation Skills/Mood, Improve/Maintain Expressive Communication/Self-Expression, Improve/Maintain Insight/Self-Awareness, and Promote Reality Orientation    Signature: Janeth Gonzalez, Licensed Professional Music Therapist (LPMT)

## 2025-03-14 NOTE — CARE COORDINATION
Psychosocial Assessment     Admission Reason: Client has been admitted to [facility] for psychiatric evaluation of psychosis     C-SSRS Lifetime Recent Completed - Current Suicide Risk:   [x] No Risk  [] Low [] Moderate [] High     Risk Factors:   Client denies SI     Protective Factors: Client denies SI       Gender:  [] Male [x] Female [] Transgender  [] Other    Sexual Orientation:  [] Heterosexual [x] Homosexual [] Bisexual [] Other    Current or Past Mental Health and/or Addictions Treatment (and response to treatment):  [x] Yes, When and Where: [facility] ago   [] No    Substance Use/Alcohol Use/Addiction (document name of substance, age of onset, how much and how often, route of use and date of last use):  [x] Reports   Substance: Alcohol, marijuana, mushroom   Age of Onset:  Teenage  How Much and how often: Would not answer   Last Usage: Would not answer     [] Denies    AUDIT: -1   DAST: 4  PHQ 9: 14     Education provided:  [x] Yes  [] No  [] N/A [] Refused    Learners: Patient [x]  Family []  Significant Other  [] Caregiver [] Other []   Readiness: Eager []   Acceptance  [x]   Nonacceptances []   Refused []   Method: Explanation [x]   Handout []   Response: Verbalizes Understanding [x]   No evidence of Learning []   Refuses []     Referral made to [facility]  [x] Yes       Date: 03/14/2025               [] No    Family History of Mental Illness or Substance Use/Abuse:   [] Yes (Specify)    [x] No    Trauma and Abuse History:   [] Reports   ( Physical []  Verbal []  Emotional []  Financial []   Sexual [] )  [x] Denies      Legal History:  []  Yes (Specify)    [x] No     Involvement:  [] Yes (Specify)    [x] No     Employment and/or Benefits:    [] Yes (Specify)   SSD []  SSI []   SNAP[] Medicaid[]  [x] No      Leisure & Recreational Interests and Hobbies/Coping Skills:      Ability to Complete Activities of Daily Living (ADLs):  [x] Yes  [] No (Specify)    Health Issues: See H & P    Confucianist

## 2025-03-15 LAB
ANION GAP SERPL CALCULATED.3IONS-SCNC: 11 MEQ/L (ref 9–15)
BUN SERPL-MCNC: 8 MG/DL (ref 6–20)
CALCIUM SERPL-MCNC: 9.2 MG/DL (ref 8.5–9.9)
CHLORIDE SERPL-SCNC: 104 MEQ/L (ref 95–107)
CO2 SERPL-SCNC: 25 MEQ/L (ref 20–31)
CREAT SERPL-MCNC: 0.7 MG/DL (ref 0.5–0.9)
GLUCOSE SERPL-MCNC: 92 MG/DL (ref 70–99)
POTASSIUM SERPL-SCNC: 3.7 MEQ/L (ref 3.4–4.9)
SODIUM SERPL-SCNC: 140 MEQ/L (ref 135–144)
T4 FREE SERPL-MCNC: 1.51 NG/DL (ref 0.84–1.68)

## 2025-03-15 PROCEDURE — 84439 ASSAY OF FREE THYROXINE: CPT

## 2025-03-15 PROCEDURE — 1240000000 HC EMOTIONAL WELLNESS R&B

## 2025-03-15 PROCEDURE — 80048 BASIC METABOLIC PNL TOTAL CA: CPT

## 2025-03-15 PROCEDURE — 36415 COLL VENOUS BLD VENIPUNCTURE: CPT

## 2025-03-15 PROCEDURE — 6370000000 HC RX 637 (ALT 250 FOR IP): Performed by: PSYCHIATRY & NEUROLOGY

## 2025-03-15 RX ADMIN — HYDROXYZINE PAMOATE 50 MG: 50 CAPSULE ORAL at 20:57

## 2025-03-15 RX ADMIN — HYDROXYZINE PAMOATE 50 MG: 50 CAPSULE ORAL at 13:13

## 2025-03-15 RX ADMIN — TRAZODONE HYDROCHLORIDE 50 MG: 50 TABLET ORAL at 20:57

## 2025-03-15 RX ADMIN — NICOTINE POLACRILEX 2 MG: 2 LOZENGE ORAL at 09:58

## 2025-03-15 RX ADMIN — NICOTINE POLACRILEX 2 MG: 2 LOZENGE ORAL at 17:45

## 2025-03-15 RX ADMIN — DIVALPROEX SODIUM 250 MG: 250 TABLET, DELAYED RELEASE ORAL at 14:31

## 2025-03-15 RX ADMIN — DIVALPROEX SODIUM 250 MG: 250 TABLET, DELAYED RELEASE ORAL at 20:57

## 2025-03-15 RX ADMIN — DIVALPROEX SODIUM 250 MG: 250 TABLET, DELAYED RELEASE ORAL at 06:28

## 2025-03-15 RX ADMIN — NICOTINE POLACRILEX 2 MG: 2 LOZENGE ORAL at 13:13

## 2025-03-15 RX ADMIN — PALIPERIDONE 3 MG: 3 TABLET, EXTENDED RELEASE ORAL at 09:56

## 2025-03-16 PROCEDURE — 6370000000 HC RX 637 (ALT 250 FOR IP): Performed by: PSYCHIATRY & NEUROLOGY

## 2025-03-16 PROCEDURE — 6370000000 HC RX 637 (ALT 250 FOR IP): Performed by: NURSE PRACTITIONER

## 2025-03-16 PROCEDURE — 1240000000 HC EMOTIONAL WELLNESS R&B

## 2025-03-16 RX ORDER — TRAZODONE HYDROCHLORIDE 150 MG/1
75 TABLET ORAL NIGHTLY PRN
Status: DISCONTINUED | OUTPATIENT
Start: 2025-03-16 | End: 2025-03-20 | Stop reason: HOSPADM

## 2025-03-16 RX ADMIN — NICOTINE POLACRILEX 2 MG: 2 LOZENGE ORAL at 07:29

## 2025-03-16 RX ADMIN — NICOTINE POLACRILEX 2 MG: 2 LOZENGE ORAL at 11:18

## 2025-03-16 RX ADMIN — PALIPERIDONE 3 MG: 3 TABLET, EXTENDED RELEASE ORAL at 09:40

## 2025-03-16 RX ADMIN — TRAZODONE HYDROCHLORIDE 75 MG: 150 TABLET ORAL at 21:43

## 2025-03-16 RX ADMIN — NICOTINE POLACRILEX 2 MG: 2 LOZENGE ORAL at 15:49

## 2025-03-16 RX ADMIN — DIVALPROEX SODIUM 250 MG: 250 TABLET, DELAYED RELEASE ORAL at 21:43

## 2025-03-16 RX ADMIN — DIVALPROEX SODIUM 250 MG: 250 TABLET, DELAYED RELEASE ORAL at 14:41

## 2025-03-16 RX ADMIN — DIVALPROEX SODIUM 250 MG: 250 TABLET, DELAYED RELEASE ORAL at 06:03

## 2025-03-16 RX ADMIN — NICOTINE POLACRILEX 2 MG: 2 LOZENGE ORAL at 21:26

## 2025-03-16 RX ADMIN — NICOTINE POLACRILEX 2 MG: 2 LOZENGE ORAL at 18:49

## 2025-03-16 RX ADMIN — HYDROXYZINE PAMOATE 50 MG: 50 CAPSULE ORAL at 11:18

## 2025-03-16 RX ADMIN — HYDROXYZINE PAMOATE 50 MG: 50 CAPSULE ORAL at 03:31

## 2025-03-16 NOTE — CONSULTS
Progress Note             Date:3/16/2025        Patient Name:Mike Wolf     YOB: 2000     Age:25 y.o.    Subjective: No events overnight, labs from yesterday/ Feeling tired. Otherwise, no issues    Past Medical History     Past Medical History:   Diagnosis Date    Depression         Past Surgical History   History reviewed. No pertinent surgical history.     Medications     Prior to Admission medications    Not on File        traZODone (DESYREL) tablet 75 mg, Nightly PRN  paliperidone (INVEGA) extended release tablet 3 mg, Daily  divalproex (DEPAKOTE) DR tablet 250 mg, 3 times per day  acetaminophen (TYLENOL) tablet 650 mg, Q4H PRN  magnesium hydroxide (MILK OF MAGNESIA) 400 MG/5ML suspension 30 mL, Daily PRN  aluminum & magnesium hydroxide-simethicone (MAALOX PLUS) 200-200-20 MG/5ML suspension 30 mL, PRN  haloperidol (HALDOL) tablet 5 mg, Q6H PRN   Or  haloperidol lactate (HALDOL) injection 5 mg, Q6H PRN  benztropine mesylate (COGENTIN) injection 2 mg, BID PRN  hydrOXYzine pamoate (VISTARIL) capsule 50 mg, Q6H PRN   Or  hydrOXYzine (VISTARIL) injection 50 mg, Q6H PRN  nicotine polacrilex (COMMIT) lozenge 2 mg, Q2H PRN        Allergies   Patient has no known allergies.    Family History   History reviewed. No pertinent family history.    Review of Systems   12 point review of systems reviewed with patient; negative other than as mentioned    Physical Exam   /84   Pulse 98   Temp 98.1 °F (36.7 °C)   Resp 16   LMP 02/12/2025 (Approximate)   SpO2 100%       CONSTITUTIONAL:  Awake, alert, no apparent distress, and appears stated age  EYES: pupils equal, round and reactive to light   NECK:  Supple   LUNGS:  No increased work of breathing, good air exchange, clear to auscultation bilaterally, no crackles or wheezing  CARDIOVASCULAR:  Normal apical impulse, regular rate and rhythm  ABDOMEN:  No scars, normal bowel sounds, soft, non-distended, non-tender  MUSCULOSKELETAL:  There is no redness,

## 2025-03-16 NOTE — CARE COORDINATION
At approx 215pm pt was approached by this SW to sign her treatment record and update her goal. She was immediately labile and anxious, refusing to speak and became irritable. Pt was asked if she wanted to speak more freely in her room and was encouraged to do so by other patients who were present. Pt was agreeable and met with this SW alone in her room. Pt was hyperverbal and visibly irritated speaking at length about her stressors and triggers prior to hospitalization and with her family. Pt believes her family gaslight her and that some of her family members are part of a cult which bother her greatly. Pt spoke at great length about not dinah mahergn able to trust anyone in her family or in her home (she resides with a friend and her gf) and is unsure if she can return home with having so many questions about things.   Pt was agitated and labile, pacing the room and stating that she feels trapped and abused.   SW deescalated and did tell pt that care for self was important and to not discredit MH professionals. That her psychosis did not mean that her trauma was not present and that they can coexist and both be issues that need attending to. This was a very important part of the conversation and needed reiterated multiple times as the pt basis for reality is skewed due to MH symtpoms. Pt states herself she as having trouble knowing if things were happening or if they were in her mind. Pt was encouraged to speak to staff openly abotu her symptoms and ask questions when she had them. Pt was told to speak to family and her gf and if she felt unsafe returning home she could be placed at [facility]. She was agreeable to speaking to her family and determining her discharge course. Pt states she will update  staff if she feels it is better for her to d/c to the [facility]'s shelter or home tomorrow 03/17.   Meeting concluded approx 240pm

## 2025-03-16 NOTE — CARE COORDINATION
Morning Community Meeting Topics    Mike Porterbenjironal attended the morning community meeting on 3/16/25.    Topics discussed today     [x] Introduction  Day of the week and date  Mask distribution  Current mask requirements  [x]Teams  Explanation of  Green and Blue team criteria  Nurses assigned to each team for today  Explanation about green and blue paper  Date  Patient's Name  Patient's Nurse  Goals  [x] Visitation  Announce the visiting hours for the day  Announce which team is allowed to have visitors for the day  Review any updated Covid 19 requirements for visitors during visitation  Vaccine Card or negative Covid test within 48 hours of visit  State Identification  Patients are reminded to alert the  at least 1 hour before visitation   [x] Unit Orientation  Coffee use  Phone location and etiquette  Shower locations  Washer and dryer location and process  Common area expectations  Staff rounds expectation  [x] Meals   Educate patient to the menu  The patient is encouraged to fill out the menu to get preferences at mealtime  The patient is educated that if they do not fill out the menu, they will get the standard tray  The coffee pot is decaf, patient encouraged to order regular coffee from menu.  Educate patient to the meal process  Patient encouraged to eat snacks provided twice daily  Snacks may stay in patient room     [x] Discharge Process  Discharge expectations  Fill out the survey after discharge   [x] Hygiene  Daily showers encouraged  Showers availability discussed   Daily dressing encouraged  Discussed wearing street clothing  Education provided on where to place linens and clothing  Linens in the hamper  personal clothing does not go into the linen hamper  [x] Group   Patient encouraged to attend group provided  Time of Group Meetings discussed  Gentle reminder that attendance is a Physician order  [x] Movement  Chair exercises completed  Stretching completed  Notes:   Patient participated

## 2025-03-17 PROBLEM — F31.64 BIPOLAR AFFECTIVE DISORDER, MIXED, SEVERE, WITH PSYCHOTIC BEHAVIOR (HCC): Status: ACTIVE | Noted: 2025-03-17

## 2025-03-17 LAB
ALBUMIN SERPL-MCNC: 4.1 G/DL (ref 3.5–4.6)
ALP SERPL-CCNC: 73 U/L (ref 40–130)
ALT SERPL-CCNC: 20 U/L (ref 0–33)
ANION GAP SERPL CALCULATED.3IONS-SCNC: 12 MEQ/L (ref 9–15)
AST SERPL-CCNC: 18 U/L (ref 0–35)
BASOPHILS # BLD: 0 K/UL (ref 0–0.2)
BASOPHILS NFR BLD: 0.5 %
BILIRUB SERPL-MCNC: 0.4 MG/DL (ref 0.2–0.7)
BUN SERPL-MCNC: 10 MG/DL (ref 6–20)
CALCIUM SERPL-MCNC: 9.3 MG/DL (ref 8.5–9.9)
CHLORIDE SERPL-SCNC: 104 MEQ/L (ref 95–107)
CO2 SERPL-SCNC: 22 MEQ/L (ref 20–31)
CREAT SERPL-MCNC: 0.61 MG/DL (ref 0.5–0.9)
EKG ATRIAL RATE: 68 BPM
EKG P AXIS: 7 DEGREES
EKG P-R INTERVAL: 114 MS
EKG Q-T INTERVAL: 394 MS
EKG QRS DURATION: 90 MS
EKG QTC CALCULATION (BAZETT): 418 MS
EKG R AXIS: 84 DEGREES
EKG T AXIS: 54 DEGREES
EKG VENTRICULAR RATE: 68 BPM
EOSINOPHIL # BLD: 0.1 K/UL (ref 0–0.7)
EOSINOPHIL NFR BLD: 1.3 %
ERYTHROCYTE [DISTWIDTH] IN BLOOD BY AUTOMATED COUNT: 14.1 % (ref 11.5–14.5)
GLOBULIN SER CALC-MCNC: 2.6 G/DL (ref 2.3–3.5)
GLUCOSE SERPL-MCNC: 132 MG/DL (ref 70–99)
HCT VFR BLD AUTO: 41 % (ref 37–47)
HGB BLD-MCNC: 14.1 G/DL (ref 12–16)
LYMPHOCYTES # BLD: 1.8 K/UL (ref 1–4.8)
LYMPHOCYTES NFR BLD: 23.7 %
MCH RBC QN AUTO: 30.7 PG (ref 27–31.3)
MCHC RBC AUTO-ENTMCNC: 34.4 % (ref 33–37)
MCV RBC AUTO: 89.3 FL (ref 79.4–94.8)
MONOCYTES # BLD: 0.7 K/UL (ref 0.2–0.8)
MONOCYTES NFR BLD: 8.4 %
NEUTROPHILS # BLD: 5.1 K/UL (ref 1.4–6.5)
NEUTS SEG NFR BLD: 65.7 %
PLATELET # BLD AUTO: 279 K/UL (ref 130–400)
POTASSIUM SERPL-SCNC: 4.1 MEQ/L (ref 3.4–4.9)
PROT SERPL-MCNC: 6.7 G/DL (ref 6.3–8)
RBC # BLD AUTO: 4.59 M/UL (ref 4.2–5.4)
SODIUM SERPL-SCNC: 138 MEQ/L (ref 135–144)
VALPROATE SERPL-MCNC: 53.9 UG/ML (ref 50–100)
WBC # BLD AUTO: 7.7 K/UL (ref 4.8–10.8)

## 2025-03-17 PROCEDURE — 36415 COLL VENOUS BLD VENIPUNCTURE: CPT

## 2025-03-17 PROCEDURE — 80053 COMPREHEN METABOLIC PANEL: CPT

## 2025-03-17 PROCEDURE — 6370000000 HC RX 637 (ALT 250 FOR IP): Performed by: NURSE PRACTITIONER

## 2025-03-17 PROCEDURE — 6370000000 HC RX 637 (ALT 250 FOR IP): Performed by: PSYCHIATRY & NEUROLOGY

## 2025-03-17 PROCEDURE — 85025 COMPLETE CBC W/AUTO DIFF WBC: CPT

## 2025-03-17 PROCEDURE — 80164 ASSAY DIPROPYLACETIC ACD TOT: CPT

## 2025-03-17 PROCEDURE — 1240000000 HC EMOTIONAL WELLNESS R&B

## 2025-03-17 PROCEDURE — 93010 ELECTROCARDIOGRAM REPORT: CPT | Performed by: INTERNAL MEDICINE

## 2025-03-17 RX ADMIN — NICOTINE POLACRILEX 2 MG: 2 LOZENGE ORAL at 20:56

## 2025-03-17 RX ADMIN — NICOTINE POLACRILEX 2 MG: 2 LOZENGE ORAL at 16:20

## 2025-03-17 RX ADMIN — DIVALPROEX SODIUM 250 MG: 250 TABLET, DELAYED RELEASE ORAL at 14:12

## 2025-03-17 RX ADMIN — NICOTINE POLACRILEX 2 MG: 2 LOZENGE ORAL at 10:05

## 2025-03-17 RX ADMIN — HYDROXYZINE PAMOATE 50 MG: 50 CAPSULE ORAL at 11:46

## 2025-03-17 RX ADMIN — NICOTINE POLACRILEX 2 MG: 2 LOZENGE ORAL at 11:46

## 2025-03-17 RX ADMIN — DIVALPROEX SODIUM 250 MG: 250 TABLET, DELAYED RELEASE ORAL at 22:02

## 2025-03-17 RX ADMIN — PALIPERIDONE 3 MG: 3 TABLET, EXTENDED RELEASE ORAL at 10:05

## 2025-03-17 RX ADMIN — TRAZODONE HYDROCHLORIDE 75 MG: 150 TABLET ORAL at 22:43

## 2025-03-17 NOTE — CARE COORDINATION
Client met with [facility] on 03/15/2025 - Cedrick Hassan     \"CPRS had first engagement with client today, she seemed to be in a good mood, reported that she came to [facility] by EMS after using marijuana, and mushrooms and earning / seeing things that were not there, has a family history of ESTHER but has never been to treatment before. Client is open to treatment at this time, I will reach out to [facility] and [facility] for residential treatment, follow up Monday 03/17/2025 early afternoon \"

## 2025-03-17 NOTE — CARE COORDINATION
Client requested to speak with LSW.   Client is overtly concerned about her HIPAA code and does not want communication with family members.     Further discussion for JLUIS and collateral information. Client reports she will consider allowing conversation with her S.O and she would likely be discharged there as well.     To revisit conversation on Tuesday 03/18/2025

## 2025-03-18 PROCEDURE — 1240000000 HC EMOTIONAL WELLNESS R&B

## 2025-03-18 PROCEDURE — 6370000000 HC RX 637 (ALT 250 FOR IP): Performed by: NURSE PRACTITIONER

## 2025-03-18 PROCEDURE — 6370000000 HC RX 637 (ALT 250 FOR IP): Performed by: PSYCHIATRY & NEUROLOGY

## 2025-03-18 RX ADMIN — NICOTINE POLACRILEX 2 MG: 2 LOZENGE ORAL at 18:58

## 2025-03-18 RX ADMIN — DIVALPROEX SODIUM 250 MG: 250 TABLET, DELAYED RELEASE ORAL at 06:05

## 2025-03-18 RX ADMIN — PALIPERIDONE 3 MG: 3 TABLET, EXTENDED RELEASE ORAL at 08:53

## 2025-03-18 RX ADMIN — NICOTINE POLACRILEX 2 MG: 2 LOZENGE ORAL at 13:16

## 2025-03-18 RX ADMIN — NICOTINE POLACRILEX 2 MG: 2 LOZENGE ORAL at 08:51

## 2025-03-18 RX ADMIN — ACETAMINOPHEN 650 MG: 325 TABLET ORAL at 08:51

## 2025-03-18 RX ADMIN — HYDROXYZINE PAMOATE 50 MG: 50 CAPSULE ORAL at 13:16

## 2025-03-18 RX ADMIN — DIVALPROEX SODIUM 250 MG: 250 TABLET, DELAYED RELEASE ORAL at 20:57

## 2025-03-18 RX ADMIN — TRAZODONE HYDROCHLORIDE 75 MG: 150 TABLET ORAL at 22:18

## 2025-03-18 RX ADMIN — DIVALPROEX SODIUM 250 MG: 250 TABLET, DELAYED RELEASE ORAL at 13:16

## 2025-03-18 ASSESSMENT — PAIN SCALES - GENERAL
PAINLEVEL_OUTOF10: 3
PAINLEVEL_OUTOF10: 0

## 2025-03-18 ASSESSMENT — PAIN DESCRIPTION - ORIENTATION: ORIENTATION: LOWER

## 2025-03-18 ASSESSMENT — PAIN DESCRIPTION - LOCATION: LOCATION: BACK

## 2025-03-18 NOTE — CARE COORDINATION
FAMILY COLLATERAL NOTE    Family/Support Name: Marisol Butterfield   Contact #: [phone]   Relationship to Pt::  Significant other         Family/Support contact aware of hospitalization: Yes   Presenting Symptoms/Current Concerns:  Paranoia, difficulty control emotions,       Top 3 Life Stressors:   School - midterms    Employment stress   Stress from family       Background History Relevant to Current Hospitalization:    Reports \"Family is very manipulative and impose themselves into Mike's life.       Family Mental Health/Substance Use History:     NA     Support Network's Goal for Hospitalization: Yes     Discharge Plan: D/C S.O. house and F/U [facility] in community as a new client       Support Network Supportive of Discharge Plan: Yes       Support can confirm Safety of Location and Security of Weapons:   None     Support agreeable to Safeguard and Monitor Medications (including Prescription and OTC): Yes, as needed     Identified Barriers to Compliance with Discharge Plan:  None     Recommendations for Support Network: HEMA meetings         [place]

## 2025-03-19 PROCEDURE — 6370000000 HC RX 637 (ALT 250 FOR IP): Performed by: NURSE PRACTITIONER

## 2025-03-19 PROCEDURE — 1240000000 HC EMOTIONAL WELLNESS R&B

## 2025-03-19 PROCEDURE — 6370000000 HC RX 637 (ALT 250 FOR IP): Performed by: PSYCHIATRY & NEUROLOGY

## 2025-03-19 RX ADMIN — PALIPERIDONE 3 MG: 3 TABLET, EXTENDED RELEASE ORAL at 09:10

## 2025-03-19 RX ADMIN — DIVALPROEX SODIUM 250 MG: 250 TABLET, DELAYED RELEASE ORAL at 13:59

## 2025-03-19 RX ADMIN — HALOPERIDOL 5 MG: 5 TABLET ORAL at 14:13

## 2025-03-19 RX ADMIN — NICOTINE POLACRILEX 2 MG: 2 LOZENGE ORAL at 12:00

## 2025-03-19 RX ADMIN — TRAZODONE HYDROCHLORIDE 75 MG: 150 TABLET ORAL at 21:36

## 2025-03-19 RX ADMIN — DIVALPROEX SODIUM 250 MG: 250 TABLET, DELAYED RELEASE ORAL at 06:23

## 2025-03-19 RX ADMIN — NICOTINE POLACRILEX 2 MG: 2 LOZENGE ORAL at 07:19

## 2025-03-19 RX ADMIN — NICOTINE POLACRILEX 2 MG: 2 LOZENGE ORAL at 16:26

## 2025-03-19 RX ADMIN — HYDROXYZINE PAMOATE 50 MG: 50 CAPSULE ORAL at 13:59

## 2025-03-19 RX ADMIN — DIVALPROEX SODIUM 250 MG: 250 TABLET, DELAYED RELEASE ORAL at 21:36

## 2025-03-19 NOTE — CARE COORDINATION
Pt denies all.  \"I'm just tired.\"  Denies depression and 1/10 anxiety.  Pt was slow to respond, but was resting in bed during assessment.  No delusions or paranoia reported this assessment.

## 2025-03-20 VITALS
RESPIRATION RATE: 16 BRPM | TEMPERATURE: 97.3 F | SYSTOLIC BLOOD PRESSURE: 143 MMHG | HEART RATE: 102 BPM | DIASTOLIC BLOOD PRESSURE: 82 MMHG | OXYGEN SATURATION: 97 %

## 2025-03-20 PROCEDURE — 90656 IIV3 VACC NO PRSV 0.5 ML IM: CPT | Performed by: PSYCHIATRY & NEUROLOGY

## 2025-03-20 PROCEDURE — 6360000002 HC RX W HCPCS: Performed by: PSYCHIATRY & NEUROLOGY

## 2025-03-20 PROCEDURE — G0008 ADMIN INFLUENZA VIRUS VAC: HCPCS | Performed by: PSYCHIATRY & NEUROLOGY

## 2025-03-20 PROCEDURE — 6370000000 HC RX 637 (ALT 250 FOR IP): Performed by: PSYCHIATRY & NEUROLOGY

## 2025-03-20 RX ORDER — PALIPERIDONE 3 MG/1
3 TABLET, EXTENDED RELEASE ORAL DAILY
Qty: 15 TABLET | Refills: 3 | Status: SHIPPED | OUTPATIENT
Start: 2025-03-20

## 2025-03-20 RX ORDER — DIVALPROEX SODIUM 250 MG/1
250 TABLET, DELAYED RELEASE ORAL EVERY 8 HOURS SCHEDULED
Qty: 45 TABLET | Refills: 3 | Status: SHIPPED | OUTPATIENT
Start: 2025-03-20

## 2025-03-20 RX ORDER — TRAZODONE HYDROCHLORIDE 50 MG/1
50 TABLET ORAL NIGHTLY PRN
Qty: 15 TABLET | Refills: 0 | Status: SHIPPED | OUTPATIENT
Start: 2025-03-20

## 2025-03-20 RX ADMIN — ACETAMINOPHEN 650 MG: 325 TABLET ORAL at 08:53

## 2025-03-20 RX ADMIN — PALIPERIDONE 3 MG: 3 TABLET, EXTENDED RELEASE ORAL at 08:53

## 2025-03-20 RX ADMIN — INFLUENZA A VIRUS A/VICTORIA/4897/2022 IVR-238 (H1N1) ANTIGEN (PROPIOLACTONE INACTIVATED), INFLUENZA A VIRUS A/THAILAND/8/2022 IVR-237 (H3N2) ANTIGEN (PROPIOLACTONE INACTIVATED), INFLUENZA B VIRUS B/AUSTRIA/1359417/2021 BVR-26 ANTIGEN (PROPIOLACTONE INACTIVATED) 0.5 ML: 15; 15; 15 INJECTION, SUSPENSION INTRAMUSCULAR at 10:33

## 2025-03-20 RX ADMIN — DIVALPROEX SODIUM 250 MG: 250 TABLET, DELAYED RELEASE ORAL at 06:05

## 2025-03-20 RX ADMIN — NICOTINE POLACRILEX 2 MG: 2 LOZENGE ORAL at 08:53

## 2025-03-20 ASSESSMENT — PAIN DESCRIPTION - LOCATION: LOCATION: BACK

## 2025-03-20 ASSESSMENT — PAIN DESCRIPTION - DESCRIPTORS: DESCRIPTORS: ACHING

## 2025-03-20 ASSESSMENT — PAIN DESCRIPTION - ORIENTATION: ORIENTATION: LOWER

## 2025-03-20 ASSESSMENT — PAIN SCALES - GENERAL: PAINLEVEL_OUTOF10: 5

## 2025-03-20 NOTE — PLAN OF CARE
Problem: Anxiety  Goal: Will report anxiety at manageable levels  Description: INTERVENTIONS:  1. Administer medication as ordered  2. Teach and rehearse alternative coping skills  3. Provide emotional support with 1:1 interaction with staff  Outcome: Not Progressing  Flowsheets (Taken 3/17/2025 7814)  Will report anxiety at manageable levels:   Administer medication as ordered   Teach and rehearse alternative coping skills   Provide emotional support with 1:1 interaction with staff     
  Problem: Confusion  Goal: Confusion, delirium, dementia, or psychosis is improved or at baseline  Description: INTERVENTIONS:  1. Assess for possible contributors to thought disturbance, including medications, impaired vision or hearing, underlying metabolic abnormalities, dehydration, psychiatric diagnoses, and notify attending LIP  2. [place] high risk fall precautions, as indicated  3. Provide frequent short contacts to provide reality reorientation, refocusing and direction  4. Decrease environmental stimuli, including noise as appropriate  5. Monitor and intervene to maintain adequate nutrition, hydration, elimination, sleep and activity  6. If unable to ensure safety without constant attention obtain sitter and review sitter guidelines with assigned personnel  7. Initiate Psychosocial CNS and Spiritual Care consult, as indicated  3/15/2025 1209 by Kasandra Bhatia, RN  Outcome: Not Progressing  Flowsheets (Taken 3/14/2025 1528)  Effect of thought disturbance (confusion, delirium, dementia, or psychosis) are managed with adequate functional status:   [place] high risk fall precautions, as indicated   Assess for contributors to thought disturbance, including medications, impaired vision or hearing, underlying metabolic abnormalities, dehydration, psychiatric diagnoses, notify LIP   Provide frequent short contacts to provide reality reorientation, refocusing and direction  3/14/2025 2306 by Sandra RN  Outcome: Progressing     Problem: Confusion  Goal: Confusion, delirium, dementia, or psychosis is improved or at baseline  Description: INTERVENTIONS:  1. Assess for possible contributors to thought disturbance, including medications, impaired vision or hearing, underlying metabolic abnormalities, dehydration, psychiatric diagnoses, and notify attending LIP  2. [place] high risk fall precautions, as indicated  3. Provide frequent short contacts to provide reality reorientation, refocusing and 
  Problem: Risk for Elopement  Goal: Patient will not exit the unit/facility without proper excort  Outcome: Progressing  Flowsheets (Taken 3/14/2025 1524)  Nursing Interventions for Elopement Risk:   Communicate to physician the risk for elopement   Communicate/escalate to nursing supervisor the risk of elopement   Make sure patient has all necessary personal care items   Reduce environmental triggers     Problem: Anxiety  Goal: Will report anxiety at manageable levels  Description: INTERVENTIONS:  1. Administer medication as ordered  2. Teach and rehearse alternative coping skills  3. Provide emotional support with 1:1 interaction with staff  Outcome: Progressing  Flowsheets (Taken 3/14/2025 1528)  Will report anxiety at manageable levels:   Administer medication as ordered   Provide emotional support with 1:1 interaction with staff   Teach and rehearse alternative coping skills     Problem: Coping  Goal: Pt/Family able to verbalize concerns and demonstrate effective coping strategies  Description: INTERVENTIONS:  1. Assist patient/family to identify coping skills, available support systems and cultural and spiritual values  2. Provide emotional support, including active listening and acknowledgement of concerns of patient and caregivers  3. Reduce environmental stimuli, as able  4. Instruct patient/family in relaxation techniques, as appropriate  5. Assess for spiritual pain/suffering and initiate Spiritual Care, Psychosocial Clinical Specialist consults as needed  Outcome: Progressing  Flowsheets (Taken 3/14/2025 1528)  Patient/family able to verbalize anxieties, fears, and concerns, and demonstrate effective coping:   Assist patient/family to identify coping skills, available support systems and cultural and spiritual values   Provide emotional support, including active listening and acknowledgement of concerns of patient and caregivers     Problem: Confusion  Goal: Confusion, delirium, dementia, or psychosis 
  Problem: Risk for Elopement  Goal: Patient will not exit the unit/facility without proper excort  Outcome: Progressing  Flowsheets (Taken 3/19/2025 1024)  Nursing Interventions for Elopement Risk:   Communicate/escalate to nursing supervisor the risk of elopement   Reduce environmental triggers   Make sure patient has all necessary personal care items   Communicate to physician the risk for elopement     Problem: Anxiety  Goal: Will report anxiety at manageable levels  Description: INTERVENTIONS:  1. Administer medication as ordered  2. Teach and rehearse alternative coping skills  3. Provide emotional support with 1:1 interaction with staff  Outcome: Progressing  Flowsheets (Taken 3/19/2025 1024)  Will report anxiety at manageable levels: Provide emotional support with 1:1 interaction with staff     Problem: Coping  Goal: Pt/Family able to verbalize concerns and demonstrate effective coping strategies  Description: INTERVENTIONS:  1. Assist patient/family to identify coping skills, available support systems and cultural and spiritual values  2. Provide emotional support, including active listening and acknowledgement of concerns of patient and caregivers  3. Reduce environmental stimuli, as able  4. Instruct patient/family in relaxation techniques, as appropriate  5. Assess for spiritual pain/suffering and initiate Spiritual Care, Psychosocial Clinical Specialist consults as needed  Outcome: Progressing  Flowsheets (Taken 3/19/2025 1024)  Patient/family able to verbalize anxieties, fears, and concerns, and demonstrate effective coping: Provide emotional support, including active listening and acknowledgement of concerns of patient and caregivers     Problem: Confusion  Goal: Confusion, delirium, dementia, or psychosis is improved or at baseline  Description: INTERVENTIONS:  1. Assess for possible contributors to thought disturbance, including medications, impaired vision or hearing, underlying metabolic 
Patient is out and social. Brightened affect. Impaired concentration. Disorganized thoughts. Anxiety and depression are \"better\" this evening. Denies SI/HI and AVH.   Problem: Risk for Elopement  Goal: Patient will not exit the unit/facility without proper excort  3/15/2025 1954 by Radha Wylie, RN  Outcome: Progressing  3/15/2025 1209 by Kasandra Bhatia RN  Outcome: Progressing  Flowsheets (Taken 3/15/2025 1141)  Nursing Interventions for Elopement Risk:   Communicate to physician the risk for elopement   Communicate/escalate to nursing supervisor the risk of elopement   Make sure patient has all necessary personal care items   Reduce environmental triggers     Problem: Anxiety  Goal: Will report anxiety at manageable levels  Description: INTERVENTIONS:  1. Administer medication as ordered  2. Teach and rehearse alternative coping skills  3. Provide emotional support with 1:1 interaction with staff  3/15/2025 1954 by Radha Wylie RN  Outcome: Progressing  3/15/2025 1209 by Kasandra Bhatia RN  Outcome: Progressing  Flowsheets (Taken 3/14/2025 1528)  Will report anxiety at manageable levels:   Administer medication as ordered   Provide emotional support with 1:1 interaction with staff   Teach and rehearse alternative coping skills     Problem: Coping  Goal: Pt/Family able to verbalize concerns and demonstrate effective coping strategies  Description: INTERVENTIONS:  1. Assist patient/family to identify coping skills, available support systems and cultural and spiritual values  2. Provide emotional support, including active listening and acknowledgement of concerns of patient and caregivers  3. Reduce environmental stimuli, as able  4. Instruct patient/family in relaxation techniques, as appropriate  5. Assess for spiritual pain/suffering and initiate Spiritual Care, Psychosocial Clinical Specialist consults as needed  3/15/2025 1954 by Radha Wylie, RN  Outcome: Progressing  3/15/2025 1209 by 
Patient visible and social with peers. Patient rates anxiety 5/10 and depression 5-6/10. Pt verbalized, \" Certain things trigger me. \"My partner called, I tried to talk about codependency she hung up on me.\" Patient partner visited. Pt denies SI, HI, and AVH. Mood, \" Tired.\" Patient stated, \" I feel like my brain is being slowed down, too slow.\" Patient relates this feeling to medications. Patient reports constipation. Patient offered/ refused MOM. Patient encouraged increase water intake. Patient provided prune juice. Medication compliant. Patient hopeful in job search utilizing [employer]     Problem: Risk for Elopement  Goal: Patient will not exit the unit/facility without proper excort  Outcome: Progressing  Flowsheets (Taken 3/18/2025 1706)  Nursing Interventions for Elopement Risk:   Shoes and clothing collected and placed in gown attire   Make sure patient has all necessary personal care items   Reduce environmental triggers     Problem: Anxiety  Goal: Will report anxiety at manageable levels  Description: INTERVENTIONS:  1. Administer medication as ordered  2. Teach and rehearse alternative coping skills  3. Provide emotional support with 1:1 interaction with staff  Outcome: Progressing  Flowsheets (Taken 3/18/2025 1706)  Will report anxiety at manageable levels:   Administer medication as ordered   Teach and rehearse alternative coping skills   Provide emotional support with 1:1 interaction with staff     Problem: Coping  Goal: Pt/Family able to verbalize concerns and demonstrate effective coping strategies  Description: INTERVENTIONS:  1. Assist patient/family to identify coping skills, available support systems and cultural and spiritual values  2. Provide emotional support, including active listening and acknowledgement of concerns of patient and caregivers  3. Reduce environmental stimuli, as able  4. Instruct patient/family in relaxation techniques, as appropriate  5. Assess for spiritual 
consults as needed  3/16/2025 2039 by Radha Wylie, RN  Outcome: Progressing  Flowsheets (Taken 3/16/2025 0939 by Flip Caldera, RN)  Patient/family able to verbalize anxieties, fears, and concerns, and demonstrate effective coping:   Assist patient/family to identify coping skills, available support systems and cultural and spiritual values   Provide emotional support, including active listening and acknowledgement of concerns of patient and caregivers   Instruct patient/family in relaxation techniques, as appropriate  3/16/2025 0731 by Flip Caldera, RN  Outcome: Progressing     Problem: Confusion  Goal: Confusion, delirium, dementia, or psychosis is improved or at baseline  Description: INTERVENTIONS:  1. Assess for possible contributors to thought disturbance, including medications, impaired vision or hearing, underlying metabolic abnormalities, dehydration, psychiatric diagnoses, and notify attending LIP  2. [place] high risk fall precautions, as indicated  3. Provide frequent short contacts to provide reality reorientation, refocusing and direction  4. Decrease environmental stimuli, including noise as appropriate  5. Monitor and intervene to maintain adequate nutrition, hydration, elimination, sleep and activity  6. If unable to ensure safety without constant attention obtain sitter and review sitter guidelines with assigned personnel  7. Initiate Psychosocial CNS and Spiritual Care consult, as indicated  3/16/2025 2039 by Radha Wylie, RN  Outcome: Progressing  Flowsheets (Taken 3/16/2025 0939 by Flip Caldera, RN)  Effect of thought disturbance (confusion, delirium, dementia, or psychosis) are managed with adequate functional status:   Assess for contributors to thought disturbance, including medications, impaired vision or hearing, underlying metabolic abnormalities, dehydration, psychiatric diagnoses, notify LIP   Decrease environmental stimuli, including noise as 
family and/or belongings  3. Encourage verbalization of thoughts and concerns in a socially appropriate manner  4. Utilize positive, consistent limit setting strategies supporting safety of patient, staff and others  5. Encourage participation in the decision making process about the behavioral management agreement  6. If a visitor's behavior poses a threat to safety call refer to organization policy.  7. Initiate consult with , Psychosocial CNS, Spiritual Care as appropriate  Outcome: Progressing     Problem: Depression/Self Harm  Goal: Effect of psychiatric condition will be minimized and patient will be protected from self harm  Description: INTERVENTIONS:  1. Assess impact of patient's symptoms on level of functioning, self care needs and offer support as indicated  2. Assess patient/family knowledge of depression, impact on illness and need for teaching  3. Provide emotional support, presence and reassurance  4. Assess for possible suicidal thoughts or ideation. If patient expresses suicidal thoughts or statements do not leave alone, initiate Suicide Precautions, move to a room close to the nursing station and obtain sitter  5. Initiate consults as appropriate with Mental Health Professional, Spiritual Care, Psychosocial CNS, and consider a recommendation to the LIP for a Psychiatric Consultation  Outcome: Progressing     Problem: Involuntary Admit  Goal: Will cooperate with staff recommendations and doctor's orders and will demonstrate appropriate behavior  Description: INTERVENTIONS:  1. Treat underlying conditions and offer medication as ordered  2. Educate regarding involuntary admission procedures and rules  3. Contain excessive/inappropriate behavior per unit and hospital policies  Outcome: Progressing     
Provide emotional support, presence and reassurance     Problem: Involuntary Admit  Goal: Will cooperate with staff recommendations and doctor's orders and will demonstrate appropriate behavior  Description: INTERVENTIONS:  1. Treat underlying conditions and offer medication as ordered  2. Educate regarding involuntary admission procedures and rules  3. Contain excessive/inappropriate behavior per unit and hospital policies  Outcome: Progressing  Flowsheets (Taken 3/17/2025 1353 by Jennifer Dallas, RN)  Will cooperate with staff recommendations and doctor's orders and will demonstrate appropriate behavior: Treat underlying conditions and offer medication as ordered     Problem: Pain  Goal: Verbalizes/displays adequate comfort level or baseline comfort level  Outcome: Progressing     Problem: Discharge Planning  Goal: Discharge to home or other facility with appropriate resources  Outcome: Progressing     Problem: Anxiety  Goal: Will report anxiety at manageable levels  Description: INTERVENTIONS:  1. Administer medication as ordered  2. Teach and rehearse alternative coping skills  3. Provide emotional support with 1:1 interaction with staff  3/17/2025 2355 by Jackie Leigh RN  Outcome: Progressing  3/17/2025 1359 by Jennifer Dallas, RN  Outcome: Not Progressing  Flowsheets (Taken 3/17/2025 1353)  Will report anxiety at manageable levels:   Administer medication as ordered   Teach and rehearse alternative coping skills   Provide emotional support with 1:1 interaction with staff     
RN  Outcome: Progressing  3/15/2025 1954 by Radha Wylie RN  Outcome: Progressing     Problem: Discharge Planning  Goal: Discharge to home or other facility with appropriate resources  3/16/2025 0731 by Flip Caldera RN  Outcome: Progressing  3/15/2025 1954 by Radha Wylie, RN  Outcome: Progressing     
as indicated  2. Assess patient/family knowledge of depression, impact on illness and need for teaching  3. Provide emotional support, presence and reassurance  4. Assess for possible suicidal thoughts or ideation. If patient expresses suicidal thoughts or statements do not leave alone, initiate Suicide Precautions, move to a room close to the nursing station and obtain sitter  5. Initiate consults as appropriate with Mental Health Professional, Spiritual Care, Psychosocial CNS, and consider a recommendation to the LIP for a Psychiatric Consultation  Outcome: Progressing     Problem: Involuntary Admit  Goal: Will cooperate with staff recommendations and doctor's orders and will demonstrate appropriate behavior  Description: INTERVENTIONS:  1. Treat underlying conditions and offer medication as ordered  2. Educate regarding involuntary admission procedures and rules  3. Contain excessive/inappropriate behavior per unit and hospital policies  Outcome: Progressing     Problem: Pain  Goal: Verbalizes/displays adequate comfort level or baseline comfort level  Outcome: Progressing     Problem: Discharge Planning  Goal: Discharge to home or other facility with appropriate resources  Outcome: Progressing     
recommendations and doctor's orders and will demonstrate appropriate behavior  Description: INTERVENTIONS:  1. Treat underlying conditions and offer medication as ordered  2. Educate regarding involuntary admission procedures and rules  3. Contain excessive/inappropriate behavior per unit and hospital policies  3/19/2025 2117 by Aba Daigle, RN  Outcome: Progressing  Flowsheets (Taken 3/19/2025 2112)  Will cooperate with staff recommendations and doctor's orders and will demonstrate appropriate behavior:   Contain excessive/inappropriate behavior per unit and hospital policies   Educate regarding involuntary admission procedures and rules   Treat underlying conditions and offer medication as ordered  3/19/2025 1131 by Aba Daigle, RN  Outcome: Progressing  Flowsheets (Taken 3/19/2025 1024)  Will cooperate with staff recommendations and doctor's orders and will demonstrate appropriate behavior:   Contain excessive/inappropriate behavior per unit and hospital policies   Educate regarding involuntary admission procedures and rules   Treat underlying conditions and offer medication as ordered     Problem: Pain  Goal: Verbalizes/displays adequate comfort level or baseline comfort level  3/19/2025 2117 by Aba Daigle, RN  Outcome: Progressing  3/19/2025 1131 by Aba Daigle, RN  Outcome: Progressing     Problem: Discharge Planning  Goal: Discharge to home or other facility with appropriate resources  3/19/2025 2117 by Aba Daigle, RN  Outcome: Progressing  3/19/2025 1131 by Aba Daigle, RN  Outcome: Progressing

## 2025-03-20 NOTE — PROGRESS NOTES
Behavioral Services  Medicare Certification Upon Admission    I certify that this patient's inpatient psychiatric hospital admission is medically necessary for:    [x] (1) Treatment which could reasonably be expected to improve this patient's condition,       [x] (2) Or for diagnostic study;     AND     [x](2) The inpatient psychiatric services are provided while the individual is under the care of a physician and are included in the individualized plan of care.    Estimated length of stay/service 3-5    Plan for post-hospital care OP care    Electronically signed by MARY RAVI MD on 3/14/2025 at 10:15 AM      
BEHAVIORAL HEALTH FOLLOW-UP NOTE     3/15/2025     Patient was seen and examined in person, Chart reviewed   Patient's case discussed with staff/team    Chief Complaint: Psychosis    Interim History:     Mike is noted to be out on the unit interactive with peers and staff, she has been attending groups and unit activity.  She met with [facility] today is interested in residential treatment for substance abuse.  Staff report that she was fairly psychotic in her behaviors yesterday believing that other people on the unit were long-lost family members  She is more linear today thoughts are more stable.  She interacts appropriately and shares fair eye contact  Future focused and goal directed  Reports feeling better since admission    Appetite:   [] Normal/Unchanged  [] Increased  [x] Decreased      Sleep:       [] Normal/Unchanged  [] Fair       [x] Poor              Energy:    [] Normal/Unchanged  [] Increased  [x] Decreased        SI [] Present  [x] Absent    HI  []Present  [x] Absent     Aggression:  [] yes  [x] no    Patient is [x] able  [] unable to CONTRACT FOR SAFETY     PAST MEDICAL/PSYCHIATRIC HISTORY:   Past Medical History:   Diagnosis Date    Depression        FAMILY/SOCIAL HISTORY:  History reviewed. No pertinent family history.  Social History     Socioeconomic History    Marital status: Single     Spouse name: Not on file    Number of children: Not on file    Years of education: Not on file    Highest education level: Not on file   Occupational History    Not on file   Tobacco Use    Smoking status: Never    Smokeless tobacco: Never   Vaping Use    Vaping status: Never Used   Substance and Sexual Activity    Alcohol use: No    Drug use: No    Sexual activity: Not on file   Other Topics Concern    Not on file   Social History Narrative    Not on file     Social Drivers of Health     Financial Resource Strain: Low Risk  (9/1/2021)    Overall Financial Resource Strain (CARDIA)     Difficulty of 
BEHAVIORAL HEALTH FOLLOW-UP NOTE     3/18/2025     Patient was seen and examined in person, Chart reviewed   Patient's case discussed with staff/team    Chief Complaint: Psychosis    Interim History:     Pt remain labile and tearful during interview  Not feeling safe going home  Remain depressed and feel that the medication is not helping her  Unable to contract for safety  Home living situation is her main stressor    Appetite:   [x] Normal/Unchanged  [] Increased  [] Decreased      Sleep:       [] Normal/Unchanged  [x] Fair       [] Poor              Energy:    [x] Normal/Unchanged  [] Increased  [] Decreased        SI [] Present  [x] Absent    HI  []Present  [x] Absent     Aggression:  [] yes  [x] no    Patient is [x] able  [] unable to CONTRACT FOR SAFETY     PAST MEDICAL/PSYCHIATRIC HISTORY:   Past Medical History:   Diagnosis Date    Depression        FAMILY/SOCIAL HISTORY:  History reviewed. No pertinent family history.  Social History     Socioeconomic History    Marital status: Single     Spouse name: Not on file    Number of children: Not on file    Years of education: Not on file    Highest education level: Not on file   Occupational History    Not on file   Tobacco Use    Smoking status: Never    Smokeless tobacco: Never   Vaping Use    Vaping status: Never Used   Substance and Sexual Activity    Alcohol use: No    Drug use: No    Sexual activity: Not on file   Other Topics Concern    Not on file   Social History Narrative    Not on file     Social Drivers of Health     Financial Resource Strain: Low Risk  (9/1/2021)    Overall Financial Resource Strain (CARDIA)     Difficulty of Paying Living Expenses: Not hard at all   Food Insecurity: No Food Insecurity (9/1/2021)    Hunger Vital Sign     Worried About Running Out of Food in the Last Year: Never true     Ran Out of Food in the Last Year: Never true   Transportation Needs: No Transportation Needs (9/1/2021)    PRAPARE - Transportation     Lack of 
CLINICAL PHARMACY NOTE: MEDS TO BEDS    Total # of Prescriptions Filled: 3   The following medications were delivered to the patient:  Divalproex Dr 250 mg Tab  Paliperidone Er 3 mg Tab  Trazodone 50 mg Tab    Additional Documentation:    
Explained and Gave newly ordered Depakote 250 and tylenol again for pain and nict edgardo, pt denied questions was agreeable  try,  
Explained and gave am meds, pt said yes to tylenol and no to extra prn vistaril, pt reported anxiety # 5 reports feeling distraught his am, pt reports living with female ,etc and a dog ,denies being able to talk with them that she thinks they may be talking about her sometimes, and doesn't know what they think ,reports poor sleep that she wakes up freq pt denies depression , denies any suicidal thoughts.   
[facility] if [facility] will be discharged tomorrow to their facility. This nurse spoke with patient and she was unaware of going to a treatment center tomorrow. Unit number given to [facility] to call back tomorrow to verify with social work    
Patient admitted to 3[facility] via WC by ED staff.  Dual skin assessment completed with Leah DIAZ. Skin found to be unremarkable and no contraband found.  Patient given a tour of her room and a tour of the unit.  Patient would like a dinner tray.  Dietary called.  Patient observed ambulating with a steady gait.  Patient denying suicidal ideation and contracts for safety on 3W.  Admission kit at .  
Patient at the desk requested/ received PRN vistaril   
Patient discharged to home via private vehicle. All belongings sent with patient and medications from [facility]. Voices no questions/concerns.   
Patient is visible on unit and social with select peers. Patient denies SI, HI. Patient is paranoid and was over heard telling peers \"I dont trust anyone here anymore\". During 1400 medication administration patient told this nurse that she feels the medication is \"making me feel more psychotic\" and states she is \"seeing and hearing\" things that are not there. Patient would not elaborate what the voices are saying or what exactly she is seeing. RN made aware. Patient is able to make needs known and agrees to maintain safety while on unit.   Electronically signed by Gina Grier LPN on 3/16/2025 at 3:12 PM      
Patient is visible on unit, social with peers and staff. Patient denies SI, HI AVT hallucinations at this time. Patient is noted to be labile and paranoid at times. Patient tearfully approached this nurse stating \"I am having a hard time learning about things and I feel I am being 'gaslight' all the time\". Patient appeared nonsensical and tangential. Patient then requested Depakote. Patient was informed that Depakote is a scheduled medication and is not able to be given at this time. Patient then requested vistaril, however patient had just taken vistaril at 1313 and could only have vistaril every 6 hours as needed. Patient was offered coloring pages and sensory room. Patient willingly accepted coloring pages and was seen socializing and appeared to be laughing and smiling with peers. Patient is able to make needs known and verbally agrees to maintain safety while on unit.   Electronically signed by Gina Grier LPN on 3/15/2025 at 4:05 PM    
Pt came to this staff nurse in tears , saying \"she wanted to change her HIPAA code that she doesn't want her Mom holli or father mesha to have any information, that they have just found out she is in the hospital , and if they find me when I leave they will hurt me,\" pt was encouraged to talk with the social workers, pt says you guys need to say Im not here, pt expressed sleep was better but she is still waking up a lot.stated now her anxiety and depression are a #10, offered and gave  vistaril 50mg and nict edgardo .  
Pt denies all depression anxiety this am reports sleep was a little bit off, she thinks is from having a prune juice before bed, pt still has nict edgardo in her mouth , denied the need for another one now, denied any suicidal thoughts or voices no paranoia expressed this am, pt has been watching tv, minimally social with peers, pt is smily   
Pt is drawing/coloring now in dinning room, stated she still woke up a lot that she is a light sleeper, pt denied any suicidal thoughts or voices, reports she is thinking about calling her roommate but will see what the doctor has to say, pt was asked if its a good place to return to live, as pt denies any gas lighting thoughts today, also says all the furniture is hers that her parents gave her, reports depression #1 anxiety #3, pt requested tylenol for back pain  
Pt is noted up on the unit, reports anxiety #7, depression #7, back soreness #3,denied voices or suicidal thoughts, reports poor sleep at home, reports eating ok, offered and gave vistaril 50 and tylenol , pt was informed may have to talk with doctor first for home meds,   
Pt is sitting in the dinning room now, stated she feels a little better, appears calmer  
Pt is tearful, talking nonsensical racing, paranoid thoughts expressed about her mom and dad and room mate that they are all talking about her and the patients are talking about her, pt expressed thoughts about school , that at [employer] school there is a cult going on and pt talked about her dad raping her sister, pt was accepting of vistaril 50 that she usually asks for daily and also haldol 5mg given po , pt appears to not swallow and a mouth check was done,   
Pt is with  now  
Pt reports am vistaril helpful , explained and gave newly ordered invega 3 mg and nict edgardo ,  pt is smiling and noted to get many books from the library this am  
Pt returned will reynoso ,says she changed her mind  
Pt tearful and pacing unit. Patient hugging all the other patients, was observed hugging three patients and attempted to hug a staff member. Pt reminded of our rules and asked to please refrain from physical contact. Pt reacted adversely to redirection and became irritated stating \"I know YOU guys have issues so I'll stop\"   
Pt up to nurses station c/o back pain and requesting to be let in to the shower again. Declined prn medication. Provided with supplies and let in to shower at this time.   
Received prn haldol for increased anxiety and agitation, per request. Pt observed crying in the dayroom and agreed to talk to this nurse in her room privately. She states that she can't trust her family and that they have lied to her continuously. She believes that they raised her in a cult. Voices that she doesn't know who to trust and believes that her family members are on the unit with her. Received supplies and let in to the shower at this time, per request.   
Received prn tylenol for aching back pain rated 8/10, per request.   
Report taken from Leah DIAZ in the [facility]/ED.  Patient is a 24yo female admitted on a pink slip from the [facility] with a dx: Psychosis.  Patient is a 25 y.o.  female who presents for psychosis.  Patient has poor insight and poor judgment.  She is currently in an active state of psychosis with paranoid and persecutory delusional thinking.  Patient has delusions that her parents and family are trying to frame her for various things and that they have tapped into her phone and computer so they can listen on conversations.  The patient's anxiety and psychosis is significantly and negatively affecting her day-to-day life.  Patient's fiancéMarisol, was contacted who confirms the patient's behaviors and delusional thinking over the past few days.  She reports that the patient is having difficulty functioning due to this psychosis.  Patient requires inpatient psychiatric admission for safety and stabilization of current symptoms.  Dx:   Acute psychosis  
See chart for EKG, done march 13, 2025 normal sr, normal EKG, hr 68, qt/qtc 394/418 appears not to have transmitted  
Spiritual Support Group Note    Number of Participants in Group: 6                       Time: 2874-8263    Goal: Relief from isolation and loneliness             Nilam Sharing             Self-understanding and gain insight              Acceptance and belonging            Recognize they are not alone                Socialization             Empowerment       Encouragement    Topic:  [x] Spiritual Wellness and Self Care                  [] Hope                     [x] Connecting with Divine/Others        [] Thankfulness and Gratitude               []  Meaningfulness and Purpose               [] Forgiveness               [] Peace               [] Connect to Community      [] Other    Participation Level:   [x] Active Listener   [] Minimal   [] Monopolizing   [x] Interactive   [] No Participation   []  Other:     Attention:   [x] Alert   [] Distractible   [] Drowsy   [] Poor   [] Other:    Manner:   [x] Cooperative   [] Suspicious   [] Withdrawn   [] Guarded   [] Irritable   [] Inhospitable   [] Other:     Others Comments from Group:    
drug-to-drug interactions and alternatives to treatment were discussed.  Collateral information: Followed by social work  CD evaluation  Encourage patient to attend group and other milieu activities.  Discharge planning discussed with the patient and treatment team.    PSYCHOTHERAPY/COUNSELING:  [x] Therapeutic interview  [x] Supportive  [] CBT  [] Ongoing  [] Other    Patient was seen 1:1 for 20 minutes, other than E&M time spent, focusing on      - coping skills techniques     - Anxiety management techniques discussed including deep breathing exercise and PMR     - discussing patients strength and weakness      - Motivational interviewing to assess the stage of change and assessing patient readiness to quit substance use.      [x] Patient continues to need, on a daily basis, active treatment furnished directly by or requiring the supervision of inpatient psychiatric personnel        Electronically signed by MARY RAVI MD on 3/17/2025 at 11:10 AM  
tomorrow    PSYCHOTHERAPY/COUNSELING:  [x] Therapeutic interview  [x] Supportive  [] CBT  [] Ongoing  [] Other      [x] Patient continues to need, on a daily basis, active treatment furnished directly by or requiring the supervision of inpatient psychiatric personnel        Electronically signed by MARY RAVI MD on 3/19/2025 at 10:52 AM  
was seen directly by the attending physician and myself and all relevant documentation was reviewed.    Continue Depakote 250 mg twice daily for stabilization of mood  Continue paliperidone 3 mg daily for psychosis  We will plan for VPA level on Monday  Increased trazodone to 75 mg nightly   Reviewed current Medications with the patient.   Risks, benefits, side effects, drug-to-drug interactions and alternatives to treatment were discussed.  Collateral information: Followed by social work  CD evaluation  Encourage patient to attend group and other milieu activities.  Discharge planning discussed with the patient and treatment team.    PSYCHOTHERAPY/COUNSELING:  [x] Therapeutic interview  [x] Supportive  [] CBT  [] Ongoing  [] Other    [x] Patient continues to need, on a daily basis, active treatment furnished directly by or requiring the supervision of inpatient psychiatric personnel      Anticipated Length of stay: TBD based on stability    NOTE: This report was transcribed using voice recognition software. Every effort was made to ensure accuracy; however, inadvertent computerized transcription errors may be present.       Electronically signed by GAMALIEL Mas CNP on 3/16/2025 at 11:59 AM

## 2025-03-20 NOTE — TRANSITION OF CARE
support for sobriety [address], [place]       [facility]  Follow up on 3/24/2025 @ 12:20pm for hospital follow up-- telehealth with Bhaktidaniellebenji Leanne [address] [facility] [address]  [phone]             Advanced Directive:   Does the patient have an appointed surrogate decision maker? No  Does the patient have a Medical Advance Directive? No  Does the patient have a Psychiatric Advance Directive? No  If the patient does not have a surrogate or Medical Advance Directive AND Psychiatric Advance Directive, the patient was offered information on these advance directives Patient declined to complete    Patient Instructions: Please continue all medications until otherwise directed by physician.      Tobacco Cessation Discharge Plan:   Is the patient a tobacco user  and needs referral for tobacco cessation? Yes  Patient referred to the following for tobacco cessation with an appointment? Patient refused  Patient was offered medication to assist with tobacco cessation at discharge? Patient refused    Alcohol/Substance Abuse Discharge Plan:   Does the patient have a history of substance/alcohol abuse and requires a referral for treatment? No  Patient referred to the following for substance/alcohol abuse treatment with an appointment? No  Patient was offered medication to assist with substance/alcohol abuse cessation at discharge? Patient refused      Patient discharged to: Home; Transition record discussed with patient/caregiver and provided this record in hard copy or electronically

## 2025-03-20 NOTE — DISCHARGE INSTR - DIET
Good nutrition is important when healing from an illness, injury, or surgery.  Follow any nutrition recommendations given to you during your hospital stay.   If you were given an oral nutrition supplement while in the hospital, continue to take this supplement at home.  You can take it with meals, in-between meals, and/or before bedtime. These supplements can be purchased at most local grocery stores, pharmacies, and chain [employer]-stores.   If you have any questions about your diet or nutrition, call the hospital and ask for the dietitian.  Resume as tolerated.

## 2025-03-20 NOTE — DISCHARGE SUMMARY
Activity: Not on file   Stress: Not on file   Social Connections: Not on file   Intimate Partner Violence: Not on file   Housing Stability: Not on file       MEDICATIONS:    Current Facility-Administered Medications:     traZODone (DESYREL) tablet 75 mg, 75 mg, Oral, Nightly PRN, Hina Chatterjee APRN - CNP, 75 mg at 03/19/25 2136    paliperidone (INVEGA) extended release tablet 3 mg, 3 mg, Oral, Daily, Nikko Dahl MD, 3 mg at 03/20/25 0853    divalproex (DEPAKOTE) DR tablet 250 mg, 250 mg, Oral, 3 times per day, Nikko Dahl MD, 250 mg at 03/20/25 0605    acetaminophen (TYLENOL) tablet 650 mg, 650 mg, Oral, Q4H PRN, Nikko Dahl MD, 650 mg at 03/20/25 0853    magnesium hydroxide (MILK OF MAGNESIA) 400 MG/5ML suspension 30 mL, 30 mL, Oral, Daily PRN, Nikko Dahl MD    aluminum & magnesium hydroxide-simethicone (MAALOX PLUS) 200-200-20 MG/5ML suspension 30 mL, 30 mL, Oral, PRN, Nikko Dahl MD    haloperidol (HALDOL) tablet 5 mg, 5 mg, Oral, Q6H PRN, 5 mg at 03/19/25 1413 **OR** haloperidol lactate (HALDOL) injection 5 mg, 5 mg, IntraMUSCular, Q6H PRN, Nikko Dahl MD    benztropine mesylate (COGENTIN) injection 2 mg, 2 mg, IntraMUSCular, BID PRN, Nikko Dahl MD    hydrOXYzine pamoate (VISTARIL) capsule 50 mg, 50 mg, Oral, Q6H PRN, 50 mg at 03/19/25 1359 **OR** hydrOXYzine (VISTARIL) injection 50 mg, 50 mg, IntraMUSCular, Q6H PRN, Nikko Dahl MD    nicotine polacrilex (COMMIT) lozenge 2 mg, 2 mg, Oral, Q2H PRN, Nikko Dahl MD, 2 mg at 03/20/25 0853    Examination:  BP (!) 143/82 Comment: RN NOTIFIED  Pulse (!) 102   Temp 97.3 °F (36.3 °C) (Oral)   Resp 16   LMP 02/12/2025 (Approximate)   SpO2 97%   Gait - steady    HOSPITAL COURSE::  Following admission to the hospital, patient had a complete physical exam and blood work up  Patient was monitored closely with suicide precaution  Patient was started on meds as listed below  Was encouraged to participate

## 2025-03-20 NOTE — DISCHARGE INSTRUCTIONS
Someone from [facility] will be calling you tomorrow to follow up on your care. If you don't hear from us, give us a call! [phone].    Keep all follow up appointments, take medications as ordered, utilize positive supports, abstain from use of alcohol and drugs. If symptoms return or you feel at risk to yourself or others, please call 911, return the nearest emergency room, or call your local crisis hotline:  [facility]: [phone]  [facility]: [phone]  [facility]: [phone]    Due to the Covid-19 Pandemic, [facility] is not currently available. For assistance with quitting smoking please go to https://smokefree.gov. A prescription for an FDA-approved tobacco cessation medication was offered at discharge and the patient refused.    You were given flu vaccine, prior to discharge.
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
